# Patient Record
Sex: MALE | Race: BLACK OR AFRICAN AMERICAN | Employment: UNEMPLOYED | URBAN - METROPOLITAN AREA
[De-identification: names, ages, dates, MRNs, and addresses within clinical notes are randomized per-mention and may not be internally consistent; named-entity substitution may affect disease eponyms.]

---

## 2021-06-10 ENCOUNTER — HOSPITAL ENCOUNTER (OUTPATIENT)
Age: 36
Setting detail: OBSERVATION
Discharge: HOME | DRG: 368 | End: 2021-06-11
Attending: EMERGENCY MEDICINE | Admitting: FAMILY MEDICINE
Payer: COMMERCIAL

## 2021-06-10 ENCOUNTER — APPOINTMENT (OUTPATIENT)
Dept: CT IMAGING | Age: 36
DRG: 368 | End: 2021-06-10
Attending: EMERGENCY MEDICINE
Payer: COMMERCIAL

## 2021-06-10 DIAGNOSIS — R11.2 INTRACTABLE NAUSEA AND VOMITING: Primary | ICD-10-CM

## 2021-06-10 DIAGNOSIS — K52.9 COLITIS: ICD-10-CM

## 2021-06-10 LAB
ALBUMIN SERPL-MCNC: 3.7 G/DL (ref 3.5–5)
ALBUMIN/GLOB SERPL: 1 (ref 1.2–3.5)
ALP SERPL-CCNC: 53 U/L (ref 50–136)
ALT SERPL-CCNC: 42 U/L (ref 12–65)
AMPHET UR QL SCN: NEGATIVE
ANION GAP SERPL CALC-SCNC: 7 MMOL/L (ref 7–16)
APPEARANCE UR: CLEAR
AST SERPL-CCNC: 31 U/L (ref 15–37)
BARBITURATES UR QL SCN: NEGATIVE
BASOPHILS # BLD: 0.1 K/UL (ref 0–0.2)
BASOPHILS NFR BLD: 1 % (ref 0–2)
BENZODIAZ UR QL: NEGATIVE
BILIRUB SERPL-MCNC: 0.5 MG/DL (ref 0.2–1.1)
BILIRUB UR QL: NEGATIVE
BUN SERPL-MCNC: 9 MG/DL (ref 6–23)
CALCIUM SERPL-MCNC: 9.2 MG/DL (ref 8.3–10.4)
CANNABINOIDS UR QL SCN: POSITIVE
CHLORIDE SERPL-SCNC: 108 MMOL/L (ref 98–107)
CO2 SERPL-SCNC: 24 MMOL/L (ref 21–32)
COCAINE UR QL SCN: NEGATIVE
COLOR UR: YELLOW
CREAT SERPL-MCNC: 0.94 MG/DL (ref 0.8–1.5)
DIFFERENTIAL METHOD BLD: ABNORMAL
EOSINOPHIL # BLD: 0.1 K/UL (ref 0–0.8)
EOSINOPHIL NFR BLD: 1 % (ref 0.5–7.8)
ERYTHROCYTE [DISTWIDTH] IN BLOOD BY AUTOMATED COUNT: 13.3 % (ref 11.9–14.6)
GLOBULIN SER CALC-MCNC: 3.8 G/DL (ref 2.3–3.5)
GLUCOSE SERPL-MCNC: 116 MG/DL (ref 65–100)
GLUCOSE UR STRIP.AUTO-MCNC: NEGATIVE MG/DL
HCT VFR BLD AUTO: 44.6 % (ref 41.1–50.3)
HGB BLD-MCNC: 14.8 G/DL (ref 13.6–17.2)
HGB UR QL STRIP: NEGATIVE
IMM GRANULOCYTES # BLD AUTO: 0.1 K/UL (ref 0–0.5)
IMM GRANULOCYTES NFR BLD AUTO: 1 % (ref 0–5)
KETONES UR QL STRIP.AUTO: 15 MG/DL
LACTATE SERPL-SCNC: 1.4 MMOL/L (ref 0.4–2)
LEUKOCYTE ESTERASE UR QL STRIP.AUTO: NEGATIVE
LIPASE SERPL-CCNC: 101 U/L (ref 73–393)
LYMPHOCYTES # BLD: 3.1 K/UL (ref 0.5–4.6)
LYMPHOCYTES NFR BLD: 22 % (ref 13–44)
MCH RBC QN AUTO: 29.3 PG (ref 26.1–32.9)
MCHC RBC AUTO-ENTMCNC: 33.2 G/DL (ref 31.4–35)
MCV RBC AUTO: 88.3 FL (ref 79.6–97.8)
METHADONE UR QL: NEGATIVE
MONOCYTES # BLD: 1.2 K/UL (ref 0.1–1.3)
MONOCYTES NFR BLD: 8 % (ref 4–12)
NEUTS SEG # BLD: 9.5 K/UL (ref 1.7–8.2)
NEUTS SEG NFR BLD: 68 % (ref 43–78)
NITRITE UR QL STRIP.AUTO: NEGATIVE
NRBC # BLD: 0 K/UL (ref 0–0.2)
OPIATES UR QL: NEGATIVE
PCP UR QL: NEGATIVE
PH UR STRIP: 8 (ref 5–9)
PLATELET # BLD AUTO: 311 K/UL (ref 150–450)
PMV BLD AUTO: 10 FL (ref 9.4–12.3)
POTASSIUM SERPL-SCNC: 4.7 MMOL/L (ref 3.5–5.1)
PROT SERPL-MCNC: 7.5 G/DL (ref 6.3–8.2)
PROT UR STRIP-MCNC: NEGATIVE MG/DL
RBC # BLD AUTO: 5.05 M/UL (ref 4.23–5.6)
SODIUM SERPL-SCNC: 139 MMOL/L (ref 136–145)
SP GR UR REFRACTOMETRY: 1.03 (ref 1–1.02)
UROBILINOGEN UR QL STRIP.AUTO: 0.2 EU/DL (ref 0.2–1)
WBC # BLD AUTO: 14 K/UL (ref 4.3–11.1)

## 2021-06-10 PROCEDURE — 96374 THER/PROPH/DIAG INJ IV PUSH: CPT

## 2021-06-10 PROCEDURE — 80307 DRUG TEST PRSMV CHEM ANLYZR: CPT

## 2021-06-10 PROCEDURE — 74011000636 HC RX REV CODE- 636: Performed by: EMERGENCY MEDICINE

## 2021-06-10 PROCEDURE — 74011000250 HC RX REV CODE- 250: Performed by: EMERGENCY MEDICINE

## 2021-06-10 PROCEDURE — 74011250637 HC RX REV CODE- 250/637: Performed by: FAMILY MEDICINE

## 2021-06-10 PROCEDURE — 99218 HC RM OBSERVATION: CPT

## 2021-06-10 PROCEDURE — 81003 URINALYSIS AUTO W/O SCOPE: CPT

## 2021-06-10 PROCEDURE — 74011250636 HC RX REV CODE- 250/636: Performed by: EMERGENCY MEDICINE

## 2021-06-10 PROCEDURE — 80053 COMPREHEN METABOLIC PANEL: CPT

## 2021-06-10 PROCEDURE — 74177 CT ABD & PELVIS W/CONTRAST: CPT

## 2021-06-10 PROCEDURE — 96376 TX/PRO/DX INJ SAME DRUG ADON: CPT

## 2021-06-10 PROCEDURE — 74011000258 HC RX REV CODE- 258: Performed by: EMERGENCY MEDICINE

## 2021-06-10 PROCEDURE — 99284 EMERGENCY DEPT VISIT MOD MDM: CPT

## 2021-06-10 PROCEDURE — C9113 INJ PANTOPRAZOLE SODIUM, VIA: HCPCS | Performed by: FAMILY MEDICINE

## 2021-06-10 PROCEDURE — 87040 BLOOD CULTURE FOR BACTERIA: CPT

## 2021-06-10 PROCEDURE — 83690 ASSAY OF LIPASE: CPT

## 2021-06-10 PROCEDURE — 2709999900 HC NON-CHARGEABLE SUPPLY

## 2021-06-10 PROCEDURE — 36415 COLL VENOUS BLD VENIPUNCTURE: CPT

## 2021-06-10 PROCEDURE — 74011000250 HC RX REV CODE- 250: Performed by: FAMILY MEDICINE

## 2021-06-10 PROCEDURE — 85025 COMPLETE CBC W/AUTO DIFF WBC: CPT

## 2021-06-10 PROCEDURE — 96375 TX/PRO/DX INJ NEW DRUG ADDON: CPT

## 2021-06-10 PROCEDURE — 74011250636 HC RX REV CODE- 250/636: Performed by: FAMILY MEDICINE

## 2021-06-10 PROCEDURE — 83605 ASSAY OF LACTIC ACID: CPT

## 2021-06-10 RX ORDER — CIPROFLOXACIN 2 MG/ML
400 INJECTION, SOLUTION INTRAVENOUS EVERY 12 HOURS
Status: DISCONTINUED | OUTPATIENT
Start: 2021-06-11 | End: 2021-06-11 | Stop reason: HOSPADM

## 2021-06-10 RX ORDER — SUCRALFATE 1 G/1
1 TABLET ORAL
Status: DISCONTINUED | OUTPATIENT
Start: 2021-06-10 | End: 2021-06-11 | Stop reason: HOSPADM

## 2021-06-10 RX ORDER — SODIUM CHLORIDE 0.9 % (FLUSH) 0.9 %
5-40 SYRINGE (ML) INJECTION AS NEEDED
Status: DISCONTINUED | OUTPATIENT
Start: 2021-06-10 | End: 2021-06-11 | Stop reason: HOSPADM

## 2021-06-10 RX ORDER — CIPROFLOXACIN 2 MG/ML
400 INJECTION, SOLUTION INTRAVENOUS ONCE
Status: COMPLETED | OUTPATIENT
Start: 2021-06-10 | End: 2021-06-10

## 2021-06-10 RX ORDER — ACETAMINOPHEN 650 MG/1
650 SUPPOSITORY RECTAL
Status: DISCONTINUED | OUTPATIENT
Start: 2021-06-10 | End: 2021-06-11 | Stop reason: HOSPADM

## 2021-06-10 RX ORDER — MORPHINE SULFATE 2 MG/ML
2 INJECTION, SOLUTION INTRAMUSCULAR; INTRAVENOUS
Status: DISCONTINUED | OUTPATIENT
Start: 2021-06-10 | End: 2021-06-11 | Stop reason: HOSPADM

## 2021-06-10 RX ORDER — SODIUM CHLORIDE 9 MG/ML
125 INJECTION, SOLUTION INTRAVENOUS CONTINUOUS
Status: DISCONTINUED | OUTPATIENT
Start: 2021-06-10 | End: 2021-06-11 | Stop reason: HOSPADM

## 2021-06-10 RX ORDER — METRONIDAZOLE 500 MG/100ML
500 INJECTION, SOLUTION INTRAVENOUS EVERY 8 HOURS
Status: DISCONTINUED | OUTPATIENT
Start: 2021-06-10 | End: 2021-06-11 | Stop reason: HOSPADM

## 2021-06-10 RX ORDER — SODIUM CHLORIDE 0.9 % (FLUSH) 0.9 %
10 SYRINGE (ML) INJECTION
Status: COMPLETED | OUTPATIENT
Start: 2021-06-10 | End: 2021-06-10

## 2021-06-10 RX ORDER — ACETAMINOPHEN 325 MG/1
650 TABLET ORAL
Status: DISCONTINUED | OUTPATIENT
Start: 2021-06-10 | End: 2021-06-11 | Stop reason: HOSPADM

## 2021-06-10 RX ORDER — ONDANSETRON 2 MG/ML
4 INJECTION INTRAMUSCULAR; INTRAVENOUS
Status: COMPLETED | OUTPATIENT
Start: 2021-06-10 | End: 2021-06-10

## 2021-06-10 RX ORDER — POLYETHYLENE GLYCOL 3350 17 G/17G
17 POWDER, FOR SOLUTION ORAL DAILY PRN
Status: DISCONTINUED | OUTPATIENT
Start: 2021-06-10 | End: 2021-06-11 | Stop reason: HOSPADM

## 2021-06-10 RX ORDER — METRONIDAZOLE 500 MG/100ML
500 INJECTION, SOLUTION INTRAVENOUS
Status: COMPLETED | OUTPATIENT
Start: 2021-06-10 | End: 2021-06-10

## 2021-06-10 RX ORDER — SODIUM CHLORIDE 0.9 % (FLUSH) 0.9 %
5-10 SYRINGE (ML) INJECTION EVERY 8 HOURS
Status: DISCONTINUED | OUTPATIENT
Start: 2021-06-10 | End: 2021-06-11 | Stop reason: HOSPADM

## 2021-06-10 RX ORDER — ONDANSETRON 2 MG/ML
4 INJECTION INTRAMUSCULAR; INTRAVENOUS
Status: DISCONTINUED | OUTPATIENT
Start: 2021-06-10 | End: 2021-06-11 | Stop reason: HOSPADM

## 2021-06-10 RX ORDER — ENOXAPARIN SODIUM 100 MG/ML
40 INJECTION SUBCUTANEOUS DAILY
Status: DISCONTINUED | OUTPATIENT
Start: 2021-06-11 | End: 2021-06-11 | Stop reason: HOSPADM

## 2021-06-10 RX ORDER — KETOROLAC TROMETHAMINE 30 MG/ML
30 INJECTION, SOLUTION INTRAMUSCULAR; INTRAVENOUS
Status: COMPLETED | OUTPATIENT
Start: 2021-06-10 | End: 2021-06-10

## 2021-06-10 RX ORDER — SODIUM CHLORIDE 0.9 % (FLUSH) 0.9 %
5-10 SYRINGE (ML) INJECTION AS NEEDED
Status: DISCONTINUED | OUTPATIENT
Start: 2021-06-10 | End: 2021-06-11 | Stop reason: HOSPADM

## 2021-06-10 RX ORDER — HALOPERIDOL 5 MG/ML
5 INJECTION INTRAMUSCULAR ONCE
Status: DISCONTINUED | OUTPATIENT
Start: 2021-06-10 | End: 2021-06-10

## 2021-06-10 RX ORDER — ONDANSETRON 4 MG/1
4 TABLET, ORALLY DISINTEGRATING ORAL
Status: DISCONTINUED | OUTPATIENT
Start: 2021-06-10 | End: 2021-06-11 | Stop reason: HOSPADM

## 2021-06-10 RX ORDER — SODIUM CHLORIDE 0.9 % (FLUSH) 0.9 %
5-40 SYRINGE (ML) INJECTION EVERY 8 HOURS
Status: DISCONTINUED | OUTPATIENT
Start: 2021-06-10 | End: 2021-06-11 | Stop reason: HOSPADM

## 2021-06-10 RX ORDER — HALOPERIDOL 5 MG/ML
5 INJECTION INTRAMUSCULAR ONCE
Status: COMPLETED | OUTPATIENT
Start: 2021-06-10 | End: 2021-06-10

## 2021-06-10 RX ADMIN — PROMETHAZINE HYDROCHLORIDE 12.5 MG: 25 INJECTION INTRAMUSCULAR; INTRAVENOUS at 11:18

## 2021-06-10 RX ADMIN — ONDANSETRON 4 MG: 2 INJECTION INTRAMUSCULAR; INTRAVENOUS at 20:28

## 2021-06-10 RX ADMIN — Medication 10 ML: at 12:21

## 2021-06-10 RX ADMIN — SODIUM CHLORIDE 1000 ML: 900 INJECTION, SOLUTION INTRAVENOUS at 11:01

## 2021-06-10 RX ADMIN — SODIUM CHLORIDE 40 MG: 9 INJECTION, SOLUTION INTRAMUSCULAR; INTRAVENOUS; SUBCUTANEOUS at 20:17

## 2021-06-10 RX ADMIN — Medication 10 ML: at 22:51

## 2021-06-10 RX ADMIN — SUCRALFATE 1 G: 1 TABLET ORAL at 22:39

## 2021-06-10 RX ADMIN — SUCRALFATE 1 G: 1 TABLET ORAL at 15:46

## 2021-06-10 RX ADMIN — SODIUM CHLORIDE 125 ML/HR: 900 INJECTION, SOLUTION INTRAVENOUS at 15:39

## 2021-06-10 RX ADMIN — MORPHINE SULFATE 2 MG: 2 INJECTION, SOLUTION INTRAMUSCULAR; INTRAVENOUS at 20:16

## 2021-06-10 RX ADMIN — SODIUM CHLORIDE 125 ML/HR: 900 INJECTION, SOLUTION INTRAVENOUS at 22:00

## 2021-06-10 RX ADMIN — ONDANSETRON 4 MG: 2 INJECTION INTRAMUSCULAR; INTRAVENOUS at 11:02

## 2021-06-10 RX ADMIN — ACETAMINOPHEN 650 MG: 325 TABLET, FILM COATED ORAL at 15:46

## 2021-06-10 RX ADMIN — Medication 10 ML: at 15:40

## 2021-06-10 RX ADMIN — KETOROLAC TROMETHAMINE 30 MG: 30 INJECTION, SOLUTION INTRAMUSCULAR; INTRAVENOUS at 13:27

## 2021-06-10 RX ADMIN — SODIUM CHLORIDE 100 ML: 900 INJECTION, SOLUTION INTRAVENOUS at 12:21

## 2021-06-10 RX ADMIN — CIPROFLOXACIN 400 MG: 2 INJECTION, SOLUTION INTRAVENOUS at 14:26

## 2021-06-10 RX ADMIN — Medication 10 ML: at 22:52

## 2021-06-10 RX ADMIN — SODIUM CHLORIDE 40 MG: 9 INJECTION, SOLUTION INTRAMUSCULAR; INTRAVENOUS; SUBCUTANEOUS at 14:25

## 2021-06-10 RX ADMIN — IOPAMIDOL 100 ML: 755 INJECTION, SOLUTION INTRAVENOUS at 12:21

## 2021-06-10 RX ADMIN — MORPHINE SULFATE 2 MG: 2 INJECTION, SOLUTION INTRAMUSCULAR; INTRAVENOUS at 14:21

## 2021-06-10 RX ADMIN — HALOPERIDOL LACTATE 5 MG: 5 INJECTION, SOLUTION INTRAMUSCULAR at 13:54

## 2021-06-10 RX ADMIN — METRONIDAZOLE 500 MG: 500 INJECTION, SOLUTION INTRAVENOUS at 14:32

## 2021-06-10 RX ADMIN — METRONIDAZOLE 500 MG: 500 INJECTION, SOLUTION INTRAVENOUS at 22:39

## 2021-06-10 NOTE — ACP (ADVANCE CARE PLANNING)
Advance care planninginitial encounter      Face to face time -15 minutes face-to-face time spent discussion the care       Patient is able to make his/her own decisions:  [X] Yes  [ ] NO    Names of POA/surrogate decision maker when patient is altered  : Wife    Other members present in the meeting : Wife  Patient / surrogate decision-maker ultimately chose. .. [X] Full code- full aggressive medical and surgical interventions, including intubations, resuscitations, pressors, artificial tube feeding  [ ] DO NOT RESUSCITATE -okay to intubate,  and other aggressive medical and surgical intervention   [ ] Not resuscitate, DO NOT INTUBATE, but okay for other aggressive medical and surgical intervention   [ ] DNR/DNI, hospice, comfort focus care to maximize patient's quality of life  [ ] DNR/DNI, strict comfort care ONLY        Further discussion regarding principle disease process. prognosis, plans of care, and treatment goals  : Discussed in detail goal of care and patient wants to be full code.

## 2021-06-10 NOTE — ED PROVIDER NOTES
79-year-old male presents with complaint of nausea, vomiting, diarrhea, generalized abdominal discomfort that is progressed since Saturday. States that he has been unable to keep anything down over the past 24 hours. Patient states that he ate what he believes to be \"raw fried chicken\" from a local [employer]. States symptoms developed after this was ingested. Patient denies fever, chills, chest pain, shortness breath, cough, melena, hematochezia, dizziness, weakness, headache, recent sick contacts, urinary symptoms, flank pain. Patient states that he did have 2 shots of liquor on Sunday but has not drank since then. Patient denies regular excessive alcohol ingestion. Patient does state that he smokes marijuana regularly having smoked earlier today. The history is provided by the patient. No  was used. Vomiting   This is a new problem. The current episode started more than 2 days ago. The problem occurs 5 to 10 times per day. The problem has not changed since onset. The emesis has an appearance of stomach contents. There has been no fever. Associated symptoms include abdominal pain and diarrhea. Pertinent negatives include no chills, no fever, no sweats, no headaches, no arthralgias, no myalgias, no cough, no URI and no headaches. History reviewed. No pertinent past medical history. History reviewed. No pertinent surgical history. History reviewed. No pertinent family history.     Social History     Socioeconomic History    Marital status: SINGLE     Spouse name: Not on file    Number of children: Not on file    Years of education: Not on file    Highest education level: Not on file   Occupational History    Not on file   Tobacco Use    Smoking status: Never Smoker   Substance and Sexual Activity    Alcohol use: No    Drug use: No    Sexual activity: Not on file   Other Topics Concern    Not on file   Social History Narrative    Not on file     Social Determinants of Health     Financial Resource Strain:     Difficulty of Paying Living Expenses:    Food Insecurity:     Worried About Running Out of Food in the Last Year:     [address] in the Last Year:    Transportation Needs:     Lack of Transportation (Medical):  Lack of Transportation (Non-Medical):    Physical Activity:     Days of Exercise per Week:     Minutes of Exercise per Session:    Stress:     Feeling of Stress :    Social Connections:     Frequency of Communication with Friends and Family:     Frequency of Social Gatherings with Friends and Family:     Attends [employer] Services:     Active Member of Clubs or Organizations:     Attends Club or Organization Meetings:     Marital Status:    Intimate Partner Violence:     Fear of Current or Ex-Partner:     Emotionally Abused:     Physically Abused:     Sexually Abused: ALLERGIES: Patient has no known allergies. Review of Systems   Constitutional: Negative for chills, fatigue and fever. HENT: Negative for congestion and rhinorrhea. Respiratory: Negative for cough and shortness of breath. Cardiovascular: Negative for chest pain. Gastrointestinal: Positive for abdominal pain, diarrhea, nausea and vomiting. Negative for anal bleeding, blood in stool and constipation. Genitourinary: Negative for decreased urine volume, dysuria, flank pain and hematuria. Musculoskeletal: Negative for arthralgias and myalgias. Skin: Negative for color change and rash. Neurological: Negative for dizziness, syncope, weakness, light-headedness and headaches. Hematological: Does not bruise/bleed easily. Psychiatric/Behavioral: Negative for confusion. Vitals:    06/10/21 1043   BP: (!) 158/107   Pulse: 75   Resp: 16   Temp: 97.7 °F (36.5 °C)   SpO2: 100%   Weight: 122.5 kg (270 lb)   Height: 6' 1\" (1.854 m)            Physical Exam  Vitals and nursing note reviewed.    Constitutional:       Appearance: Normal appearance. Comments: Ill-appearing. HENT:      Head: Normocephalic. Nose: Nose normal.      Mouth/Throat:      Mouth: Mucous membranes are moist.   Eyes:      Extraocular Movements: Extraocular movements intact. Pupils: Pupils are equal, round, and reactive to light. Cardiovascular:      Rate and Rhythm: Normal rate. Pulses: Normal pulses. Heart sounds: Normal heart sounds. Pulmonary:      Effort: Pulmonary effort is normal.      Breath sounds: Normal breath sounds. Comments: CTAB. Abdominal:      General: Bowel sounds are normal.      Palpations: Abdomen is soft. Tenderness: There is no abdominal tenderness. There is no guarding or rebound. Comments: Soft. Obese. Diffuse tenderness palpation. No rebound or guarding. No CVA tenderness. Musculoskeletal:         General: Normal range of motion. Cervical back: Normal range of motion. No rigidity. Skin:     Findings: No erythema or rash. Neurological:      General: No focal deficit present. Mental Status: He is alert and oriented to person, place, and time. Motor: No weakness. MDM  Number of Diagnoses or Management Options  Colitis: new and requires workup  Intractable nausea and vomiting: new and requires workup  Diagnosis management comments: VSS. Afebrile. With numerous episodes of nonbilious nonbloody emesis in the department. Patient given Zofran, Phenergan without improvement of symptoms. Patient with elevated white blood cell count 14. CT abdomen pelvis with thickening of descending colon. Patient continues to vomit profusely in room. Patient given Toradol 30 mg IV. Will cover w/ Cipro + Flagyl. Blood cultures obtained prior to antibiotics given. Hospitalist consulted for admission. Will order Haldol as well.          Amount and/or Complexity of Data Reviewed  Clinical lab tests: ordered and reviewed  Tests in the radiology section of CPT®: reviewed and ordered  Tests in the medicine section of CPT®: ordered and reviewed  Review and summarize past medical records: yes  Discuss the patient with other providers: yes  Independent visualization of images, tracings, or specimens: yes    Risk of Complications, Morbidity, and/or Mortality  Presenting problems: moderate  Diagnostic procedures: moderate  Management options: moderate    Patient Progress  Patient progress: stable    ED Course as of Sal 10 1300   Thu Sal 10, 2021   1241 CT abd/pelvis    CT PELVIS:  No mass, adenopathy or abnormal fluid collection.     No bowel obstruction. There does appear to be wall thickening in the ascending  colon with no infiltration in the surrounding fat. This is nonspecific.           IMPRESSION  1. Mild wall thickening in the ascending colon. [DF]      ED Course User Index  [DF] David Rodriguez MD       EKG    Date/Time: 6/10/2021 1:04 PM  Performed by: David Rodriguez MD  Authorized by:  David Rodriguez MD     ECG reviewed by ED Physician in the absence of a cardiologist: yes    Rate:     ECG rate:  65    ECG rate assessment: normal    Rhythm:     Rhythm: sinus rhythm    Ectopy:     Ectopy: none    QRS:     QRS axis:  Normal    QRS intervals:  Normal  Conduction:     Conduction: normal    ST segments:     ST segments:  Normal  T waves:     T waves: normal                Results Include:    Recent Results (from the past 24 hour(s))   CBC WITH AUTOMATED DIFF    Collection Time: 06/10/21 11:01 AM   Result Value Ref Range    WBC 14.0 (H) 4.3 - 11.1 K/uL    RBC 5.05 4.23 - 5.6 M/uL    HGB 14.8 13.6 - 17.2 g/dL    HCT 44.6 41.1 - 50.3 %    MCV 88.3 79.6 - 97.8 FL    MCH 29.3 26.1 - 32.9 PG    MCHC 33.2 31.4 - 35.0 g/dL    RDW 13.3 11.9 - 14.6 %    PLATELET 657 198 - 312 K/uL    MPV 10.0 9.4 - 12.3 FL    ABSOLUTE NRBC 0.00 0.0 - 0.2 K/uL    DF AUTOMATED      NEUTROPHILS 68 43 - 78 %    LYMPHOCYTES 22 13 - 44 %    MONOCYTES 8 4.0 - 12.0 %    EOSINOPHILS 1 0.5 - 7.8 % BASOPHILS 1 0.0 - 2.0 %    IMMATURE GRANULOCYTES 1 0.0 - 5.0 %    ABS. NEUTROPHILS 9.5 (H) 1.7 - 8.2 K/UL    ABS. LYMPHOCYTES 3.1 0.5 - 4.6 K/UL    ABS. MONOCYTES 1.2 0.1 - 1.3 K/UL    ABS. EOSINOPHILS 0.1 0.0 - 0.8 K/UL    ABS. BASOPHILS 0.1 0.0 - 0.2 K/UL    ABS. IMM. GRANS. 0.1 0.0 - 0.5 K/UL   METABOLIC PANEL, COMPREHENSIVE    Collection Time: 06/10/21 11:01 AM   Result Value Ref Range    Sodium 139 136 - 145 mmol/L    Potassium 4.7 3.5 - 5.1 mmol/L    Chloride 108 (H) 98 - 107 mmol/L    CO2 24 21 - 32 mmol/L    Anion gap 7 7 - 16 mmol/L    Glucose 116 (H) 65 - 100 mg/dL    BUN 9 6 - 23 MG/DL    Creatinine 0.94 0.8 - 1.5 MG/DL    GFR est AA >60 >60 ml/min/1.73m2    GFR est non-AA >60 >60 ml/min/1.73m2    Calcium 9.2 8.3 - 10.4 MG/DL    Bilirubin, total 0.5 0.2 - 1.1 MG/DL    ALT (SGPT) 42 12 - 65 U/L    AST (SGOT) 31 15 - 37 U/L    Alk. phosphatase 53 50 - 136 U/L    Protein, total 7.5 6.3 - 8.2 g/dL    Albumin 3.7 3.5 - 5.0 g/dL    Globulin 3.8 (H) 2.3 - 3.5 g/dL    A-G Ratio 1.0 (L) 1.2 - 3.5     LIPASE    Collection Time: 06/10/21 11:01 AM   Result Value Ref Range    Lipase 101 73 - 393 U/L     Jorge Ng MD; 6/10/2021 @1:03 PM Voice dictation software was used during the making of this note. This software is not perfect and grammatical and other typographical errors may be present.   This note has not been proofread for errors.  ===================================================================

## 2021-06-10 NOTE — H&P
[facility] Hospitalist Note     Admit Date:  6/10/2021 10:45 AM   Name:  Gautam Hayes   Age:  38 y.o.  :  1985   MRN:  [id number]   PCP:  None  Treatment Team: Attending Provider: Ed Loyola MD; Primary Nurse: Aung Holguin, RN; : Cynthia Johnson    HPI/Subjective:   26-year-old male with past medical history significant for GERD presented to the ED with persistent nausea/vomiting/diarrhea and generalized abdominal pain. Patient reports symptoms started on Monday after eating chicken from a local [employer]. He states he is not able to keep anything down for 1 day. Reports several episodes of diarrhea and vomiting/day. Patient denies fever, chills, shortness of breath, chest pain, melena, hematochezia, dizziness, or dysuria. Denies any sick contact. He reports he drinks socially, last drink on . He smokes marijuana, last smoked today. In the ED patient was vitally stable, afebrile. Patient was given Zofran/Phenergan without improvement of persistent nausea/vomiting. Labs remarkable for leukocytosis. UDS positive for THC. CT abdomen findings suggestive of mild colitis. Hospitalist consulted for further management.     Assessment and Plan:     Gastroenteritis/colitis:  Symptoms started after eating chicken at a local [employer]  Patient with numerous episodes of vomiting in the ED, leukocytosis  CT abdomen with mild wall thickness of ascending colon  Blood culture obtained in the ED  UA negative for UTI  UDS positive for THC  Stool culture ordered  Check lactic acid  Patient was given 1 dose of Cipro/Flagyl in the ED, continue empirically for now  Protonix 40 mg IV twice daily  Sucralfate 4 times daily  Continue maintenance IV fluid  Pain control  Antiemetic  Clear liquid diet, hold if not able to tolerate    Elevated blood pressure:  Likely due to pain  Pain control  Monitor for now, if remains elevated will add antihypertensive    Marijuana use:      Discharge planning: Admit to medical floor    DVT ppx ordered  Code status:  Full  Estimated LOS: Less than 2 midnights  Risk:  high    Hospital Problems as of 6/10/2021 Never Reviewed        Codes Class Noted - Resolved POA    Gastroenteritis ICD-10-CM: K52.9  ICD-9-CM: 558.9  6/10/2021 - Present Unknown        Intractable nausea and vomiting ICD-10-CM: R11.2  ICD-9-CM: 536.2  6/10/2021 - Present Unknown                10 systems reviewed and negative except as noted in HPI. Past Medical History:   Diagnosis Date    Gastroenteritis 6/10/2021      History reviewed. No pertinent surgical history. No Known Allergies   Social History     Tobacco Use    Smoking status: Never Smoker   Substance Use Topics    Alcohol use: No      History reviewed. No pertinent family history. Family history reviewed and noncontributory. There is no immunization history on file for this patient. PTA Medications:  None       Objective:     Patient Vitals for the past 24 hrs:   Temp Pulse Resp BP SpO2   06/10/21 1043 97.7 °F (36.5 °C) 75 16 (!) 158/107 100 %     Oxygen Therapy  O2 Sat (%): 100 % (06/10/21 1043)  O2 Device: None (Room air) (06/10/21 1043)    Estimated body mass index is 35.62 kg/m² as calculated from the following:    Height as of this encounter: 6' 1\" (1.854 m). Weight as of this encounter: 122.5 kg (270 lb). No intake or output data in the 24 hours ending 06/10/21 1409    *Note that automatically entered I/Os may not be accurate; dependent on patient compliance with collection and accurate  by assistants. Physical Exam:  General:    Well nourished. Alert. Eyes:   Normal sclerae. Extraocular movements intact. HENT:  Normocephalic, atraumatic. Moist mucous membranes  CV:   RRR. No m/r/g. Lungs:  CTAB. No wheezing, rhonchi, or rales. Abdomen: Soft, tender to palpate mid abdomen, nondistended  Extremities: Warm and dry. No cyanosis or edema.   Neurologic: CN II-XII grossly intact. Sensation intact. Skin:     No rashes or jaundice. Normal coloration  Psych:  Normal mood and affect. I reviewed the labs, imaging, EKGs, telemetry, and other studies done this admission. Data Reviewed:   Recent Results (from the past 24 hour(s))   CBC WITH AUTOMATED DIFF    Collection Time: 06/10/21 11:01 AM   Result Value Ref Range    WBC 14.0 (H) 4.3 - 11.1 K/uL    RBC 5.05 4.23 - 5.6 M/uL    HGB 14.8 13.6 - 17.2 g/dL    HCT 44.6 41.1 - 50.3 %    MCV 88.3 79.6 - 97.8 FL    MCH 29.3 26.1 - 32.9 PG    MCHC 33.2 31.4 - 35.0 g/dL    RDW 13.3 11.9 - 14.6 %    PLATELET 235 773 - 034 K/uL    MPV 10.0 9.4 - 12.3 FL    ABSOLUTE NRBC 0.00 0.0 - 0.2 K/uL    DF AUTOMATED      NEUTROPHILS 68 43 - 78 %    LYMPHOCYTES 22 13 - 44 %    MONOCYTES 8 4.0 - 12.0 %    EOSINOPHILS 1 0.5 - 7.8 %    BASOPHILS 1 0.0 - 2.0 %    IMMATURE GRANULOCYTES 1 0.0 - 5.0 %    ABS. NEUTROPHILS 9.5 (H) 1.7 - 8.2 K/UL    ABS. LYMPHOCYTES 3.1 0.5 - 4.6 K/UL    ABS. MONOCYTES 1.2 0.1 - 1.3 K/UL    ABS. EOSINOPHILS 0.1 0.0 - 0.8 K/UL    ABS. BASOPHILS 0.1 0.0 - 0.2 K/UL    ABS. IMM. GRANS. 0.1 0.0 - 0.5 K/UL   METABOLIC PANEL, COMPREHENSIVE    Collection Time: 06/10/21 11:01 AM   Result Value Ref Range    Sodium 139 136 - 145 mmol/L    Potassium 4.7 3.5 - 5.1 mmol/L    Chloride 108 (H) 98 - 107 mmol/L    CO2 24 21 - 32 mmol/L    Anion gap 7 7 - 16 mmol/L    Glucose 116 (H) 65 - 100 mg/dL    BUN 9 6 - 23 MG/DL    Creatinine 0.94 0.8 - 1.5 MG/DL    GFR est AA >60 >60 ml/min/1.73m2    GFR est non-AA >60 >60 ml/min/1.73m2    Calcium 9.2 8.3 - 10.4 MG/DL    Bilirubin, total 0.5 0.2 - 1.1 MG/DL    ALT (SGPT) 42 12 - 65 U/L    AST (SGOT) 31 15 - 37 U/L    Alk.  phosphatase 53 50 - 136 U/L    Protein, total 7.5 6.3 - 8.2 g/dL    Albumin 3.7 3.5 - 5.0 g/dL    Globulin 3.8 (H) 2.3 - 3.5 g/dL    A-G Ratio 1.0 (L) 1.2 - 3.5     LIPASE    Collection Time: 06/10/21 11:01 AM   Result Value Ref Range    Lipase 101 73 - 393 U/L   URINALYSIS W/ RFLX MICROSCOPIC    Collection Time: 06/10/21  1:28 PM   Result Value Ref Range    Color YELLOW      Appearance CLEAR      Specific gravity 1.028 (H) 1.001 - 1.023      pH (UA) 8.0 5.0 - 9.0      Protein Negative NEG mg/dL    Glucose Negative mg/dL    Ketone 15 (A) NEG mg/dL    Bilirubin Negative NEG      Blood Negative NEG      Urobilinogen 0.2 0.2 - 1.0 EU/dL    Nitrites Negative NEG      Leukocyte Esterase Negative NEG         All Micro Results     Procedure Component Value Units Date/Time    CULTURE, BLOOD [[id number]]     Order Status: Sent Specimen: Blood     CULTURE, BLOOD [[id number]]     Order Status: Sent Specimen: Blood           Current Facility-Administered Medications   Medication Dose Route Frequency    sodium chloride (NS) flush 5-10 mL  5-10 mL IntraVENous Q8H    sodium chloride (NS) flush 5-10 mL  5-10 mL IntraVENous PRN    metroNIDAZOLE (FLAGYL) IVPB premix 500 mg  500 mg IntraVENous NOW    ciprofloxacin (CIPRO) 400 mg in D5W IVPB (premix)  400 mg IntraVENous ONCE    pantoprazole (PROTONIX) 40 mg in 0.9% sodium chloride 10 mL injection  40 mg IntraVENous Q12H    sucralfate (CARAFATE) tablet 1 g  1 g Oral AC&HS    ciprofloxacin (CIPRO) 400 mg in D5W IVPB (premix)  400 mg IntraVENous Q12H    metroNIDAZOLE (FLAGYL) IVPB premix 500 mg  500 mg IntraVENous Q8H    0.9% sodium chloride infusion  125 mL/hr IntraVENous CONTINUOUS    morphine injection 2 mg  2 mg IntraVENous Q6H PRN     No current outpatient medications on file. Other Studies:  No results found for this visit on 06/10/21.     CT ABD PELV W CONT    Result Date: 6/10/2021  CT abdomen and pelvis DATE: 6/10/2021 CLINICAL INFORMATION: Abdominal pain Spiral images of the abdomen and pelvis were obtained using 1 cc Isovue 370 intravenous contrast. All CT scanners at this facility use one or all of the following:  automated exposure control, adjustment of the mA and or kVp according to patient size, iterative reconstruction CT ABDOMEN: Upper images show calcified lymph node inferior left hilum. Spleen is normal. Liver is normal. No calcified gallstones. Normal pancreas. Normal adrenals. Normal kidneys. No hydronephrosis. Abdominal aorta is normal in size. CT PELVIS: No mass, adenopathy or abnormal fluid collection. No bowel obstruction. There does appear to be wall thickening in the ascending colon with no infiltration in the surrounding fat. This is nonspecific. 1. Mild wall thickening in the ascending colon. SARS-CoV-2 Lab Results  \"Novel Coronavirus\" Test: No results found for: COV2NT   \"Emergent Disease\" Test: No results found for: EDPR  \"SARS-COV-2\" Test: No results found for: XGCOVT  Rapid Test: No results found for: COVR            Part of this note was written by using a voice dictation software and the note has been proof read but may still contain some grammatical/other typographical errors.     Signed:  Gustavo Manning MD

## 2021-06-10 NOTE — ED PROVIDER NOTES
42-year-old male presents with complaint of nausea, vomiting, diarrhea, generalized abdominal discomfort that is progressed since Saturday. States that he has been unable to keep anything down over the past 24 hours. Patient states that he ate what he believes to be \"raw fried chicken\" from a local [employer]. States symptoms developed after this was ingested. Patient denies fever, chills, chest pain, shortness breath, cough, melena, hematochezia, dizziness, weakness, headache, recent sick contacts, urinary symptoms, flank pain. Patient states that he did have 2 shots of liquor on Sunday but has not drank since then. Patient denies regular excessive alcohol ingestion. Patient does state that he smokes marijuana regularly having smoked earlier today. The history is provided by the patient. No  was used. Vomiting   This is a new problem. The current episode started more than 2 days ago. The problem occurs 5 to 10 times per day. The problem has not changed since onset. The emesis has an appearance of stomach contents. There has been no fever. Associated symptoms include abdominal pain and diarrhea. Pertinent negatives include no chills, no fever, no sweats, no headaches, no arthralgias, no myalgias, no cough, no URI and no headaches. History reviewed. No pertinent past medical history. History reviewed. No pertinent surgical history. History reviewed. No pertinent family history.     Social History     Socioeconomic History    Marital status: SINGLE     Spouse name: Not on file    Number of children: Not on file    Years of education: Not on file    Highest education level: Not on file   Occupational History    Not on file   Tobacco Use    Smoking status: Never Smoker   Substance and Sexual Activity    Alcohol use: No    Drug use: No    Sexual activity: Not on file   Other Topics Concern    Not on file   Social History Narrative    Not on file     Social Determinants of Health     Financial Resource Strain:     Difficulty of Paying Living Expenses:    Food Insecurity:     Worried About Running Out of Food in the Last Year:     [address] in the Last Year:    Transportation Needs:     Lack of Transportation (Medical):  Lack of Transportation (Non-Medical):    Physical Activity:     Days of Exercise per Week:     Minutes of Exercise per Session:    Stress:     Feeling of Stress :    Social Connections:     Frequency of Communication with Friends and Family:     Frequency of Social Gatherings with Friends and Family:     Attends Islam Services:     Active Member of Clubs or Organizations:     Attends Club or Organization Meetings:     Marital Status:    Intimate Partner Violence:     Fear of Current or Ex-Partner:     Emotionally Abused:     Physically Abused:     Sexually Abused: ALLERGIES: Patient has no known allergies. Review of Systems   Constitutional: Negative for chills, fatigue and fever. HENT: Negative for congestion and rhinorrhea. Respiratory: Negative for cough and shortness of breath. Cardiovascular: Negative for chest pain. Gastrointestinal: Positive for abdominal pain, diarrhea, nausea and vomiting. Negative for anal bleeding, blood in stool and constipation. Genitourinary: Negative for decreased urine volume, dysuria, flank pain and hematuria. Musculoskeletal: Negative for arthralgias and myalgias. Skin: Negative for color change and rash. Neurological: Negative for dizziness, syncope, weakness, light-headedness and headaches. Hematological: Does not bruise/bleed easily. Psychiatric/Behavioral: Negative for confusion. Vitals:    06/10/21 1043   BP: (!) 158/107   Pulse: 75   Resp: 16   Temp: 97.7 °F (36.5 °C)   SpO2: 100%   Weight: 122.5 kg (270 lb)   Height: 6' 1\" (1.854 m)            Physical Exam  Vitals and nursing note reviewed.    Constitutional:       Appearance: Normal appearance. Comments: Ill-appearing. HENT:      Head: Normocephalic. Nose: Nose normal.      Mouth/Throat:      Mouth: Mucous membranes are moist.   Eyes:      Extraocular Movements: Extraocular movements intact. Pupils: Pupils are equal, round, and reactive to light. Cardiovascular:      Rate and Rhythm: Normal rate. Pulses: Normal pulses. Heart sounds: Normal heart sounds. Pulmonary:      Effort: Pulmonary effort is normal.      Breath sounds: Normal breath sounds. Comments: CTAB. Abdominal:      General: Bowel sounds are normal.      Palpations: Abdomen is soft. Tenderness: There is no abdominal tenderness. There is no guarding or rebound. Comments: Soft. Obese. Diffuse tenderness palpation. No rebound or guarding. No CVA tenderness. Musculoskeletal:         General: Normal range of motion. Cervical back: Normal range of motion. No rigidity. Skin:     Findings: No erythema or rash. Neurological:      General: No focal deficit present. Mental Status: He is alert and oriented to person, place, and time. Motor: No weakness. MDM  Number of Diagnoses or Management Options  Colitis: new and requires workup  Intractable nausea and vomiting: new and requires workup  Diagnosis management comments: VSS. Afebrile. With numerous episodes of nonbilious nonbloody emesis in the department. Patient given Zofran, Phenergan without improvement of symptoms. Patient with elevated white blood cell count 14. CT abdomen pelvis with thickening of descending colon. Patient continues to vomit profusely in room. Patient given Toradol 30 mg IV. Will cover w/ Cipro + Flagyl. Blood cultures obtained prior to antibiotics given. Hospitalist consulted for admission. Will order Haldol as well.          Amount and/or Complexity of Data Reviewed  Clinical lab tests: ordered and reviewed  Tests in the radiology section of CPT®: reviewed and ordered  Tests in the medicine section of CPT®: ordered and reviewed  Review and summarize past medical records: yes  Discuss the patient with other providers: yes  Independent visualization of images, tracings, or specimens: yes    Risk of Complications, Morbidity, and/or Mortality  Presenting problems: moderate  Diagnostic procedures: moderate  Management options: moderate    Patient Progress  Patient progress: stable    ED Course as of Sal 10 1300   Thu Sal 10, 2021   1241 CT abd/pelvis    CT PELVIS:  No mass, adenopathy or abnormal fluid collection.     No bowel obstruction. There does appear to be wall thickening in the ascending  colon with no infiltration in the surrounding fat. This is nonspecific.           IMPRESSION  1. Mild wall thickening in the ascending colon. [DF]      ED Course User Index  [DF] Delfino Nuno MD       EKG    Date/Time: 6/10/2021 1:04 PM  Performed by: Delfino Nuno MD  Authorized by:  Delfino Nuno MD     ECG reviewed by ED Physician in the absence of a cardiologist: yes    Rate:     ECG rate:  65    ECG rate assessment: normal    Rhythm:     Rhythm: sinus rhythm    Ectopy:     Ectopy: none    QRS:     QRS axis:  Normal    QRS intervals:  Normal  Conduction:     Conduction: normal    ST segments:     ST segments:  Normal  T waves:     T waves: normal                Results Include:    Recent Results (from the past 24 hour(s))   CBC WITH AUTOMATED DIFF    Collection Time: 06/10/21 11:01 AM   Result Value Ref Range    WBC 14.0 (H) 4.3 - 11.1 K/uL    RBC 5.05 4.23 - 5.6 M/uL    HGB 14.8 13.6 - 17.2 g/dL    HCT 44.6 41.1 - 50.3 %    MCV 88.3 79.6 - 97.8 FL    MCH 29.3 26.1 - 32.9 PG    MCHC 33.2 31.4 - 35.0 g/dL    RDW 13.3 11.9 - 14.6 %    PLATELET 095 250 - 066 K/uL    MPV 10.0 9.4 - 12.3 FL    ABSOLUTE NRBC 0.00 0.0 - 0.2 K/uL    DF AUTOMATED      NEUTROPHILS 68 43 - 78 %    LYMPHOCYTES 22 13 - 44 %    MONOCYTES 8 4.0 - 12.0 %    EOSINOPHILS 1 0.5 - 7.8 % BASOPHILS 1 0.0 - 2.0 %    IMMATURE GRANULOCYTES 1 0.0 - 5.0 %    ABS. NEUTROPHILS 9.5 (H) 1.7 - 8.2 K/UL    ABS. LYMPHOCYTES 3.1 0.5 - 4.6 K/UL    ABS. MONOCYTES 1.2 0.1 - 1.3 K/UL    ABS. EOSINOPHILS 0.1 0.0 - 0.8 K/UL    ABS. BASOPHILS 0.1 0.0 - 0.2 K/UL    ABS. IMM. GRANS. 0.1 0.0 - 0.5 K/UL   METABOLIC PANEL, COMPREHENSIVE    Collection Time: 06/10/21 11:01 AM   Result Value Ref Range    Sodium 139 136 - 145 mmol/L    Potassium 4.7 3.5 - 5.1 mmol/L    Chloride 108 (H) 98 - 107 mmol/L    CO2 24 21 - 32 mmol/L    Anion gap 7 7 - 16 mmol/L    Glucose 116 (H) 65 - 100 mg/dL    BUN 9 6 - 23 MG/DL    Creatinine 0.94 0.8 - 1.5 MG/DL    GFR est AA >60 >60 ml/min/1.73m2    GFR est non-AA >60 >60 ml/min/1.73m2    Calcium 9.2 8.3 - 10.4 MG/DL    Bilirubin, total 0.5 0.2 - 1.1 MG/DL    ALT (SGPT) 42 12 - 65 U/L    AST (SGOT) 31 15 - 37 U/L    Alk. phosphatase 53 50 - 136 U/L    Protein, total 7.5 6.3 - 8.2 g/dL    Albumin 3.7 3.5 - 5.0 g/dL    Globulin 3.8 (H) 2.3 - 3.5 g/dL    A-G Ratio 1.0 (L) 1.2 - 3.5     LIPASE    Collection Time: 06/10/21 11:01 AM   Result Value Ref Range    Lipase 101 73 - 393 U/L     Jorge Man MD; 6/10/2021 @1:03 PM Voice dictation software was used during the making of this note. This software is not perfect and grammatical and other typographical errors may be present.   This note has not been proofread for errors.  ===================================================================

## 2021-06-10 NOTE — PROGRESS NOTES
SW met with patient to screen for discharge needs. Patient advises they are currently uninsured and are not established with primary care. SW provided verbal education on self-pay resources such as [employer], [employer], and [employer]. Patient urged to follow up with resources ASAP. All questions answered. Patient verbalized understanding and agreement.       Constantino Leonard LMSW    [facility]    * [email or web address]

## 2021-06-10 NOTE — ED NOTES
TRANSFER - OUT REPORT:    Verbal report given to BIJAN Cervantes on [facility]  being transferred to [facility] [address] for routine progression of care       Report consisted of patients Situation, Background, Assessment and   Recommendations(SBAR). Information from the following report(s) SBAR and ED Summary was reviewed with the receiving nurse. Lines:   Peripheral IV 06/10/21 Anterior; Left Hand (Active)       Peripheral IV 06/10/21 Distal;Right Cephalic (Active)        Opportunity for questions and clarification was provided.       Patient transported with:   Monitor

## 2021-06-10 NOTE — ED NOTES
TRANSFER - OUT REPORT:    Verbal report given to BIJAN Cervantes on [facility]  being transferred to [facility] for routine progression of care       Report consisted of patients Situation, Background, Assessment and   Recommendations(SBAR). Information from the following report(s) SBAR and ED Summary was reviewed with the receiving nurse. Lines:   Peripheral IV 06/10/21 Anterior; Left Hand (Active)       Peripheral IV 06/10/21 Distal;Right Cephalic (Active)        Opportunity for questions and clarification was provided.       Patient transported with:   Monitor

## 2021-06-10 NOTE — ACP (ADVANCE CARE PLANNING)
Advance care planning-initial encounter      Face to face time -15 minutes face-to-face time spent discussion the care       Patient is able to make his/her own decisions:  [X] Yes  [ ] NO    Names of POA/surrogate decision maker when patient is altered  : Wife    Other members present in the meeting : Wife  Patient / surrogate decision-maker ultimately chose. .. [X] Full code- full aggressive medical and surgical interventions, including intubations, resuscitations, pressors, artificial tube feeding  [ ] DO NOT RESUSCITATE -okay to intubate,  and other aggressive medical and surgical intervention   [ ] Not resuscitate, DO NOT INTUBATE, but okay for other aggressive medical and surgical intervention   [ ] DNR/DNI, hospice, comfort focus care to maximize patient's quality of life  [ ] DNR/DNI, strict comfort care ONLY        Further discussion regarding principle disease process. prognosis, plans of care, and treatment goals  : Discussed in detail goal of care and patient wants to be full code.

## 2021-06-10 NOTE — PROGRESS NOTES
06/10/21 1535   Dual Skin Pressure Injury Assessment   Dual Skin Pressure Injury Assessment WDL   Second Care Provider (Based on [address] [place] [address]) BIJAN Thomas   Skin Integumentary   Skin Integumentary (WDL) WDL    Pressure  Injury Documentation No Pressure Injury Noted-Pressure Ulcer Prevention Initiated

## 2021-06-10 NOTE — PROGRESS NOTES
SW reviewed patient's chart and conducted a baseline assessment. Discharge plan at this time is as follows:     Care Management Interventions  PCP Verified by CM: Yes ([facility])  Mode of Transport at Discharge: Self  Transition of Care Consult (CM Consult): Discharge Planning  Current Support Network: Own Home, Lives with Spouse  Confirm Follow Up Transport: Family  Discharge Location  Discharge Placement: Home with family assistance      *Please note that discharge plans can change throughout an inpatient admission.  Ensure that you are referring to the most recent social work/nurse case management note for current discharge plan*     Juan Felix, [address] [place] [address] Work   [facility]    * [email or web address]

## 2021-06-10 NOTE — ED TRIAGE NOTES
Pt states he ate some \"raw\" chicken on Sat and has been vomiting since but is getting worse and now having abd pain. Denies diarrhea. Masked for triage.

## 2021-06-10 NOTE — PROGRESS NOTES
SW reviewed patient's chart and conducted a baseline assessment. Discharge plan at this time is as follows:     Care Management Interventions  PCP Verified by CM: Yes ([facility])  Mode of Transport at Discharge: Self  Transition of Care Consult (CM Consult): Discharge Planning  Current Support Network: Own Home, Lives with Spouse  Confirm Follow Up Transport: Family  Discharge Location  Discharge Placement: Home with family assistance      *Please note that discharge plans can change throughout an inpatient admission.  Ensure that you are referring to the most recent social work/nurse case management note for current discharge plan*     Rambo Segal, [address] [place] [address] Work   [facility]    * [email or web address]

## 2021-06-10 NOTE — H&P
[facility] Hospitalist Note     Admit Date:  6/10/2021 10:45 AM   Name:  Arias Boykin   Age:  38 y.o.  :  1985   MRN:  [id number]   PCP:  None  Treatment Team: Attending Provider: Regine Nobles MD; Primary Nurse: Franca Oliveros, RN; : Maria A Shirley    HPI/Subjective:   42-year-old male with past medical history significant for GERD presented to the ED with persistent nausea/vomiting/diarrhea and generalized abdominal pain. Patient reports symptoms started on Monday after eating chicken from a local [employer]. He states he is not able to keep anything down for 1 day. Reports several episodes of diarrhea and vomiting/day. Patient denies fever, chills, shortness of breath, chest pain, melena, hematochezia, dizziness, or dysuria. Denies any sick contact. He reports he drinks socially, last drink on . He smokes marijuana, last smoked today. In the ED patient was vitally stable, afebrile. Patient was given Zofran/Phenergan without improvement of persistent nausea/vomiting. Labs remarkable for leukocytosis. UDS positive for THC. CT abdomen findings suggestive of mild colitis. Hospitalist consulted for further management.     Assessment and Plan:     Gastroenteritis/colitis:  Symptoms started after eating chicken at a local [employer]  Patient with numerous episodes of vomiting in the ED, leukocytosis  CT abdomen with mild wall thickness of ascending colon  Blood culture obtained in the ED  UA negative for UTI  UDS positive for THC  Stool culture ordered  Check lactic acid  Patient was given 1 dose of Cipro/Flagyl in the ED, continue empirically for now  Protonix 40 mg IV twice daily  Sucralfate 4 times daily  Continue maintenance IV fluid  Pain control  Antiemetic  Clear liquid diet, hold if not able to tolerate    Elevated blood pressure:  Likely due to pain  Pain control  Monitor for now, if remains elevated will add antihypertensive    Marijuana use:      Discharge planning: Admit to medical floor    DVT ppx ordered  Code status:  Full  Estimated LOS: Less than 2 midnights  Risk:  high    Hospital Problems as of 6/10/2021 Never Reviewed        Codes Class Noted - Resolved POA    Gastroenteritis ICD-10-CM: K52.9  ICD-9-CM: 558.9  6/10/2021 - Present Unknown        Intractable nausea and vomiting ICD-10-CM: R11.2  ICD-9-CM: 536.2  6/10/2021 - Present Unknown                10 systems reviewed and negative except as noted in HPI. Past Medical History:   Diagnosis Date    Gastroenteritis 6/10/2021      History reviewed. No pertinent surgical history. No Known Allergies   Social History     Tobacco Use    Smoking status: Never Smoker   Substance Use Topics    Alcohol use: No      History reviewed. No pertinent family history. Family history reviewed and noncontributory. There is no immunization history on file for this patient. PTA Medications:  None       Objective:     Patient Vitals for the past 24 hrs:   Temp Pulse Resp BP SpO2   06/10/21 1043 97.7 °F (36.5 °C) 75 16 (!) 158/107 100 %     Oxygen Therapy  O2 Sat (%): 100 % (06/10/21 1043)  O2 Device: None (Room air) (06/10/21 1043)    Estimated body mass index is 35.62 kg/m² as calculated from the following:    Height as of this encounter: 6' 1\" (1.854 m). Weight as of this encounter: 122.5 kg (270 lb). No intake or output data in the 24 hours ending 06/10/21 1409    *Note that automatically entered I/Os may not be accurate; dependent on patient compliance with collection and accurate  by assistants. Physical Exam:  General:    Well nourished. Alert. Eyes:   Normal sclerae. Extraocular movements intact. HENT:  Normocephalic, atraumatic. Moist mucous membranes  CV:   RRR. No m/r/g. Lungs:  CTAB. No wheezing, rhonchi, or rales. Abdomen: Soft, tender to palpate mid abdomen, nondistended  Extremities: Warm and dry. No cyanosis or edema.   Neurologic: CN II-XII grossly intact. Sensation intact. Skin:     No rashes or jaundice. Normal coloration  Psych:  Normal mood and affect. I reviewed the labs, imaging, EKGs, telemetry, and other studies done this admission. Data Reviewed:   Recent Results (from the past 24 hour(s))   CBC WITH AUTOMATED DIFF    Collection Time: 06/10/21 11:01 AM   Result Value Ref Range    WBC 14.0 (H) 4.3 - 11.1 K/uL    RBC 5.05 4.23 - 5.6 M/uL    HGB 14.8 13.6 - 17.2 g/dL    HCT 44.6 41.1 - 50.3 %    MCV 88.3 79.6 - 97.8 FL    MCH 29.3 26.1 - 32.9 PG    MCHC 33.2 31.4 - 35.0 g/dL    RDW 13.3 11.9 - 14.6 %    PLATELET 844 103 - 663 K/uL    MPV 10.0 9.4 - 12.3 FL    ABSOLUTE NRBC 0.00 0.0 - 0.2 K/uL    DF AUTOMATED      NEUTROPHILS 68 43 - 78 %    LYMPHOCYTES 22 13 - 44 %    MONOCYTES 8 4.0 - 12.0 %    EOSINOPHILS 1 0.5 - 7.8 %    BASOPHILS 1 0.0 - 2.0 %    IMMATURE GRANULOCYTES 1 0.0 - 5.0 %    ABS. NEUTROPHILS 9.5 (H) 1.7 - 8.2 K/UL    ABS. LYMPHOCYTES 3.1 0.5 - 4.6 K/UL    ABS. MONOCYTES 1.2 0.1 - 1.3 K/UL    ABS. EOSINOPHILS 0.1 0.0 - 0.8 K/UL    ABS. BASOPHILS 0.1 0.0 - 0.2 K/UL    ABS. IMM. GRANS. 0.1 0.0 - 0.5 K/UL   METABOLIC PANEL, COMPREHENSIVE    Collection Time: 06/10/21 11:01 AM   Result Value Ref Range    Sodium 139 136 - 145 mmol/L    Potassium 4.7 3.5 - 5.1 mmol/L    Chloride 108 (H) 98 - 107 mmol/L    CO2 24 21 - 32 mmol/L    Anion gap 7 7 - 16 mmol/L    Glucose 116 (H) 65 - 100 mg/dL    BUN 9 6 - 23 MG/DL    Creatinine 0.94 0.8 - 1.5 MG/DL    GFR est AA >60 >60 ml/min/1.73m2    GFR est non-AA >60 >60 ml/min/1.73m2    Calcium 9.2 8.3 - 10.4 MG/DL    Bilirubin, total 0.5 0.2 - 1.1 MG/DL    ALT (SGPT) 42 12 - 65 U/L    AST (SGOT) 31 15 - 37 U/L    Alk.  phosphatase 53 50 - 136 U/L    Protein, total 7.5 6.3 - 8.2 g/dL    Albumin 3.7 3.5 - 5.0 g/dL    Globulin 3.8 (H) 2.3 - 3.5 g/dL    A-G Ratio 1.0 (L) 1.2 - 3.5     LIPASE    Collection Time: 06/10/21 11:01 AM   Result Value Ref Range    Lipase 101 73 - 393 U/L   URINALYSIS W/ RFLX MICROSCOPIC    Collection Time: 06/10/21  1:28 PM   Result Value Ref Range    Color YELLOW      Appearance CLEAR      Specific gravity 1.028 (H) 1.001 - 1.023      pH (UA) 8.0 5.0 - 9.0      Protein Negative NEG mg/dL    Glucose Negative mg/dL    Ketone 15 (A) NEG mg/dL    Bilirubin Negative NEG      Blood Negative NEG      Urobilinogen 0.2 0.2 - 1.0 EU/dL    Nitrites Negative NEG      Leukocyte Esterase Negative NEG         All Micro Results     Procedure Component Value Units Date/Time    CULTURE, BLOOD [[id number]]     Order Status: Sent Specimen: Blood     CULTURE, BLOOD [[id number]]     Order Status: Sent Specimen: Blood           Current Facility-Administered Medications   Medication Dose Route Frequency    sodium chloride (NS) flush 5-10 mL  5-10 mL IntraVENous Q8H    sodium chloride (NS) flush 5-10 mL  5-10 mL IntraVENous PRN    metroNIDAZOLE (FLAGYL) IVPB premix 500 mg  500 mg IntraVENous NOW    ciprofloxacin (CIPRO) 400 mg in D5W IVPB (premix)  400 mg IntraVENous ONCE    pantoprazole (PROTONIX) 40 mg in 0.9% sodium chloride 10 mL injection  40 mg IntraVENous Q12H    sucralfate (CARAFATE) tablet 1 g  1 g Oral AC&HS    ciprofloxacin (CIPRO) 400 mg in D5W IVPB (premix)  400 mg IntraVENous Q12H    metroNIDAZOLE (FLAGYL) IVPB premix 500 mg  500 mg IntraVENous Q8H    0.9% sodium chloride infusion  125 mL/hr IntraVENous CONTINUOUS    morphine injection 2 mg  2 mg IntraVENous Q6H PRN     No current outpatient medications on file. Other Studies:  No results found for this visit on 06/10/21.     CT ABD PELV W CONT    Result Date: 6/10/2021  CT abdomen and pelvis DATE: 6/10/2021 CLINICAL INFORMATION: Abdominal pain Spiral images of the abdomen and pelvis were obtained using 1 cc Isovue 370 intravenous contrast. All CT scanners at this facility use one or all of the following:  automated exposure control, adjustment of the mA and or kVp according to patient size, iterative reconstruction CT ABDOMEN: Upper images show calcified lymph node inferior left hilum. Spleen is normal. Liver is normal. No calcified gallstones. Normal pancreas. Normal adrenals. Normal kidneys. No hydronephrosis. Abdominal aorta is normal in size. CT PELVIS: No mass, adenopathy or abnormal fluid collection. No bowel obstruction. There does appear to be wall thickening in the ascending colon with no infiltration in the surrounding fat. This is nonspecific. 1. Mild wall thickening in the ascending colon. SARS-CoV-2 Lab Results  \"Novel Coronavirus\" Test: No results found for: COV2NT   \"Emergent Disease\" Test: No results found for: EDPR  \"SARS-COV-2\" Test: No results found for: XGCOVT  Rapid Test: No results found for: COVR            Part of this note was written by using a voice dictation software and the note has been proof read but may still contain some grammatical/other typographical errors.     Signed:  Jeannine Whitman MD

## 2021-06-10 NOTE — PROGRESS NOTES
SW met with patient to screen for discharge needs. Patient advises they are currently uninsured and are not established with primary care. SW provided verbal education on self-pay resources such as [employer], [employer], and [employer]. Patient urged to follow up with resources ASAP. All questions answered. Patient verbalized understanding and agreement.       Chase Wilcox LMSW    [facility]    * [email or web address]

## 2021-06-10 NOTE — PROGRESS NOTES
06/10/21 1535   Dual Skin Pressure Injury Assessment   Dual Skin Pressure Injury Assessment WDL   Second Care Provider (Based on [address] [place] [address]) BIJAN Thomas   Skin Integumentary   Skin Integumentary (WDL) WDL    Pressure  Injury Documentation No Pressure Injury Noted-Pressure Ulcer Prevention Initiated

## 2021-06-11 VITALS
BODY MASS INDEX: 35.78 KG/M2 | TEMPERATURE: 99.1 F | WEIGHT: 270 LBS | HEIGHT: 73 IN | SYSTOLIC BLOOD PRESSURE: 154 MMHG | DIASTOLIC BLOOD PRESSURE: 98 MMHG | RESPIRATION RATE: 20 BRPM | HEART RATE: 74 BPM | OXYGEN SATURATION: 97 %

## 2021-06-11 LAB
ANION GAP SERPL CALC-SCNC: 10 MMOL/L (ref 7–16)
BASOPHILS # BLD: 0 K/UL (ref 0–0.2)
BASOPHILS NFR BLD: 0 % (ref 0–2)
BUN SERPL-MCNC: 5 MG/DL (ref 6–23)
CALCIUM SERPL-MCNC: 8.6 MG/DL (ref 8.3–10.4)
CHLORIDE SERPL-SCNC: 106 MMOL/L (ref 98–107)
CO2 SERPL-SCNC: 22 MMOL/L (ref 21–32)
CREAT SERPL-MCNC: 0.79 MG/DL (ref 0.8–1.5)
DIFFERENTIAL METHOD BLD: ABNORMAL
EOSINOPHIL # BLD: 0 K/UL (ref 0–0.8)
EOSINOPHIL NFR BLD: 0 % (ref 0.5–7.8)
ERYTHROCYTE [DISTWIDTH] IN BLOOD BY AUTOMATED COUNT: 13.3 % (ref 11.9–14.6)
GLUCOSE SERPL-MCNC: 109 MG/DL (ref 65–100)
HCT VFR BLD AUTO: 45.1 % (ref 41.1–50.3)
HGB BLD-MCNC: 14.9 G/DL (ref 13.6–17.2)
IMM GRANULOCYTES # BLD AUTO: 0.1 K/UL (ref 0–0.5)
IMM GRANULOCYTES NFR BLD AUTO: 1 % (ref 0–5)
LYMPHOCYTES # BLD: 1.9 K/UL (ref 0.5–4.6)
LYMPHOCYTES NFR BLD: 10 % (ref 13–44)
MCH RBC QN AUTO: 29.3 PG (ref 26.1–32.9)
MCHC RBC AUTO-ENTMCNC: 33 G/DL (ref 31.4–35)
MCV RBC AUTO: 88.6 FL (ref 79.6–97.8)
MONOCYTES # BLD: 1.9 K/UL (ref 0.1–1.3)
MONOCYTES NFR BLD: 10 % (ref 4–12)
NEUTS SEG # BLD: 15.2 K/UL (ref 1.7–8.2)
NEUTS SEG NFR BLD: 79 % (ref 43–78)
NRBC # BLD: 0 K/UL (ref 0–0.2)
PLATELET # BLD AUTO: 297 K/UL (ref 150–450)
PMV BLD AUTO: 9.8 FL (ref 9.4–12.3)
POTASSIUM SERPL-SCNC: 3.4 MMOL/L (ref 3.5–5.1)
RBC # BLD AUTO: 5.09 M/UL (ref 4.23–5.6)
SODIUM SERPL-SCNC: 138 MMOL/L (ref 136–145)
WBC # BLD AUTO: 19.1 K/UL (ref 4.3–11.1)

## 2021-06-11 PROCEDURE — 96376 TX/PRO/DX INJ SAME DRUG ADON: CPT

## 2021-06-11 PROCEDURE — 80048 BASIC METABOLIC PNL TOTAL CA: CPT

## 2021-06-11 PROCEDURE — C9113 INJ PANTOPRAZOLE SODIUM, VIA: HCPCS | Performed by: FAMILY MEDICINE

## 2021-06-11 PROCEDURE — 2709999900 HC NON-CHARGEABLE SUPPLY

## 2021-06-11 PROCEDURE — 99218 HC RM OBSERVATION: CPT

## 2021-06-11 PROCEDURE — 74011250637 HC RX REV CODE- 250/637: Performed by: FAMILY MEDICINE

## 2021-06-11 PROCEDURE — 85025 COMPLETE CBC W/AUTO DIFF WBC: CPT

## 2021-06-11 PROCEDURE — 96372 THER/PROPH/DIAG INJ SC/IM: CPT

## 2021-06-11 PROCEDURE — 74011250636 HC RX REV CODE- 250/636: Performed by: FAMILY MEDICINE

## 2021-06-11 PROCEDURE — 36415 COLL VENOUS BLD VENIPUNCTURE: CPT

## 2021-06-11 RX ORDER — CIPROFLOXACIN 500 MG/1
500 TABLET ORAL 2 TIMES DAILY
Qty: 8 TABLET | Refills: 0 | Status: SHIPPED | OUTPATIENT
Start: 2021-06-11 | End: 2021-06-16

## 2021-06-11 RX ORDER — POTASSIUM CHLORIDE 20 MEQ/1
40 TABLET, EXTENDED RELEASE ORAL
Status: COMPLETED | OUTPATIENT
Start: 2021-06-11 | End: 2021-06-11

## 2021-06-11 RX ORDER — PANTOPRAZOLE SODIUM 40 MG/1
40 TABLET, DELAYED RELEASE ORAL 2 TIMES DAILY
Qty: 28 TABLET | Refills: 0 | Status: SHIPPED | OUTPATIENT
Start: 2021-06-11 | End: 2021-06-16

## 2021-06-11 RX ORDER — ONDANSETRON 4 MG/1
4 TABLET, ORALLY DISINTEGRATING ORAL
Qty: 15 TABLET | Refills: 0 | Status: SHIPPED | OUTPATIENT
Start: 2021-06-11 | End: 2021-06-16

## 2021-06-11 RX ORDER — METRONIDAZOLE 500 MG/1
500 TABLET ORAL 3 TIMES DAILY
Qty: 12 TABLET | Refills: 0 | Status: SHIPPED | OUTPATIENT
Start: 2021-06-11 | End: 2021-06-16

## 2021-06-11 RX ORDER — SUCRALFATE 1 G/1
1 TABLET ORAL
Qty: 28 TABLET | Refills: 0 | Status: SHIPPED | OUTPATIENT
Start: 2021-06-11 | End: 2021-06-16

## 2021-06-11 RX ADMIN — ONDANSETRON 4 MG: 4 TABLET, ORALLY DISINTEGRATING ORAL at 06:38

## 2021-06-11 RX ADMIN — CIPROFLOXACIN 400 MG: 2 INJECTION, SOLUTION INTRAVENOUS at 02:31

## 2021-06-11 RX ADMIN — POTASSIUM CHLORIDE 40 MEQ: 1500 TABLET, EXTENDED RELEASE ORAL at 08:02

## 2021-06-11 RX ADMIN — Medication 10 ML: at 06:06

## 2021-06-11 RX ADMIN — ENOXAPARIN SODIUM 40 MG: 40 INJECTION SUBCUTANEOUS at 07:37

## 2021-06-11 RX ADMIN — SUCRALFATE 1 G: 1 TABLET ORAL at 11:47

## 2021-06-11 RX ADMIN — SODIUM CHLORIDE 40 MG: 9 INJECTION, SOLUTION INTRAMUSCULAR; INTRAVENOUS; SUBCUTANEOUS at 07:37

## 2021-06-11 RX ADMIN — SUCRALFATE 1 G: 1 TABLET ORAL at 06:05

## 2021-06-11 RX ADMIN — METRONIDAZOLE 500 MG: 500 INJECTION, SOLUTION INTRAVENOUS at 05:55

## 2021-06-11 NOTE — PROGRESS NOTES
Care Management Interventions  PCP Verified by CM: Yes (set up appointment with [employer] [facility] for June 23 at 140 pm )  Palliative Care Criteria Met (RRAT>21 & CHF Dx)?: No (Dx Gastroenteritis )  Mode of Transport at Discharge: Other (see comment) (wife)  Transition of Care Consult (CM Consult): Discharge Planning  Discharge Durable Medical Equipment: No (none)  Physical Therapy Consult: No  Occupational Therapy Consult: No  Speech Therapy Consult: No  Current Support Network: Lives with Spouse  Confirm Follow Up Transport: Self  Discharge Location  Discharge Placement: Home with family assistance  Interdisciplinary rounds with Dr. Jose Washington, CM, nursing, dietary and PT for plan of care. Patient is medically stable for d/c today. CM met with patient for d/c plans. Prior to admission patient lives with his wife independent of ADL's and requires no DME. He voices no concerns or needs for d/c. CM provided him with self pay packet to include [employer], [employer], [address] [place] [address] and [employer] in [employer]. Discussed importance of following up with a PCP and [facility], [facility] or Clinic. He would like referral to [employer] [facility]. CM contacted them and spoke with Jeannine for appointments and set up one for June 23 at 140 pm. Patient has been provided with this information and states \"I will follow up with them\". Patient on 5 medications for d/c Cipro $6, Flagyl 10.82 , Protonix 10.62 Carafate 10.44 and Zofran 11.79 with the coupon and good rx at [facility]. CM asked patient if he thought he would be able to afford approximately $50 or would he need medication assistance and that CM would assist with medications. He stated that he was able to afford the $50. CM provided patient with Good Rx card and coupons for [facility]. Patient to d/c home with spouse.

## 2021-06-11 NOTE — PROGRESS NOTES
[address] June 11, 2021       RE: Micha Curtis      To Whom It May Concern,    This is to certify that Micha Curtis was admitted 6/10-6/11 at [facility]     Please feel free to contact my office if you have any questions or concerns. Thank you for your assistance in this matter.       Sincerely,  Ian Foster RN

## 2021-06-11 NOTE — PROGRESS NOTES
Care Management Interventions  PCP Verified by CM: Yes (set up appointment with [employer] for June 23 at 140 pm )  Palliative Care Criteria Met (RRAT>21 & CHF Dx)?: No (Dx Gastroenteritis )  Mode of Transport at Discharge: Other (see comment) (wife)  Transition of Care Consult (CM Consult): Discharge Planning  Discharge Durable Medical Equipment: No (none)  Physical Therapy Consult: No  Occupational Therapy Consult: No  Speech Therapy Consult: No  Current Support Network: Lives with Spouse  Confirm Follow Up Transport: Self  Discharge Location  Discharge Placement: Home with family assistance  Interdisciplinary rounds with Dr. Savannah De La Cruz, CM, nursing, dietary and PT for plan of care. Patient is medically stable for d/c today. CM met with patient for d/c plans. Prior to admission patient lives with his wife independent of ADL's and requires no DME. He voices no concerns or needs for d/c. CM provided him with self pay packet to include [employer], [employer], [address] [place] [address] and [employer]. Discussed importance of following up with a PCP and Counts include 234 beds at the [facility], [facility] or [facility]. He would like referral to [facility] [employer]. CM contacted them and spoke with Jeannine for appointments and set up one for June 23 at 140 pm. Patient has been provided with this information and states \"I will follow up with them\". Patient on 5 medications for d/c Cipro $6, Flagyl 10.82 , Protonix 10.62 Carafate 10.44 and Zofran 11.79 with the coupon and good rx at [facility]. CM asked patient if he thought he would be able to afford approximately $50 or would he need medication assistance and that CM would assist with medications. He stated that he was able to afford the $50. CM provided patient with Good Rx card and coupons for [facility]. Patient to d/c home with spouse.

## 2021-06-11 NOTE — PROGRESS NOTES
Tiigi 34 June 11, 2021       RE: Be Ambrosio      To Whom It May Concern,    This is to certify that Be Ambrosio was admitted 6/10-6/11 at [facility]     Please feel free to contact my office if you have any questions or concerns. Thank you for your assistance in this matter.       Sincerely,  Delaney Abbott RN

## 2021-06-11 NOTE — PROGRESS NOTES
Problem: Pressure Injury - Risk of  Goal: *Prevention of pressure injury  Description: Document Ildefonso Scale and appropriate interventions in the flowsheet.   Outcome: Progressing Towards Goal  Note: Pressure Injury Interventions:       Moisture Interventions: Maintain skin hydration (lotion/cream)    Activity Interventions: Increase time out of bed    Mobility Interventions: Pressure redistribution bed/mattress (bed type)    Nutrition Interventions: Document food/fluid/supplement intake

## 2021-06-11 NOTE — PROGRESS NOTES
END OF SHIFT NOTE:    Intake/Output  No intake/output data recorded. Voiding: YES  Catheter: NO    Stool:  0 occurrences. Emesis:  0 occurrences. VITAL SIGNS  Patient Vitals for the past 12 hrs:   Temp Pulse Resp BP SpO2   06/11/21 0226 99.4 °F (37.4 °C) 76 20 (!) 149/88 96 %   06/10/21 2245 98.1 °F (36.7 °C) 86 20 136/77 100 %   06/10/21 1937 98 °F (36.7 °C) 65 18 (!) 175/93 100 %       Pain Assessment  Pain 1  Pain Scale 1: Visual (06/10/21 2110)  Pain Intensity 1: 0 (06/10/21 2110)  Patient Stated Pain Goal: 0 (06/10/21 2015)  Pain Reassessment 1: Patient resting w/respiratory rate greater than 10 (06/10/21 2110)  Pain Onset 1: PTA (06/10/21 2015)  Pain Location 1: Abdomen (06/10/21 2015)  Pain Orientation 1: Mid (06/10/21 2015)  Pain Description 1: Aching;Constant (06/10/21 2015)  Pain Intervention(s) 1: Medication (see MAR) (06/10/21 2015)    Ambulating  Yes    Additional Information:   - no bowel movement, stool sample still needed   - pt complaining of nausea throughout shift, PRN zofran 2x   - PRN morphine 1x        Shift report given to oncoming nurse at the bedside.     Veverly DEQUAN Castañeda

## 2021-06-11 NOTE — DISCHARGE SUMMARY
Hospitalist Discharge Summary     Admit Date:  6/10/2021 10:45 AM   DC note date: 2021  Name:  Hiren Lees   Age:  38 y.o.  :  1985   MRN:  [id number]   PCP:  None  Treatment Team: Attending Provider: Derek Rebolledo MD; : Faye Sewell    Problem List for this Hospitalization:  Hospital Problems as of 2021 Never Reviewed        Codes Class Noted - Resolved POA    * (Principal) Gastroenteritis ICD-10-CM: K52.9  ICD-9-CM: 558.9  6/10/2021 - Present Unknown        Intractable nausea and vomiting ICD-10-CM: R11.2  ICD-9-CM: 536.2  6/10/2021 - Present Unknown                Admission HPI from 6/10/2021:    \" 50-year-old male with past medical history significant for GERD presented to the ED with persistent nausea/vomiting/diarrhea and generalized abdominal pain. Patient reports symptoms started on Monday after eating chicken from a local [place]. He states he is not able to keep anything down for 1 day. Reports several episodes of diarrhea and vomiting/day. Patient denies fever, chills, shortness of breath, chest pain, melena, hematochezia, dizziness, or dysuria. Denies any sick contact. He reports he drinks socially, last drink on . He smokes marijuana, last smoked today.     In the ED patient was vitally stable, afebrile. Patient was given Zofran/Phenergan without improvement of persistent nausea/vomiting. Labs remarkable for leukocytosis. UDS positive for THC. CT abdomen findings suggestive of mild colitis. Hospitalist consulted for further management. \"    Hospital Course:  Patient is admitted with intractable nausea/vomitings and generalized abdominal pain, likely secondary to gastroenteritis. CT abdominal findings suggestive of mild colitis. Patient started on empiric IV Cipro/Flagyl. Also with significant history of GERD and gastritis. He is started on Protonix and sucralfate.   Initially started on clear liquid diet and later diet advanced as tolerated. Patient symptoms improved significantly. No more vomiting, diarrhea is resolving and abdominal pain has been improved. Leukocytosis could be reactive in the setting of intractable vomiting versus infectious etiology. Patient is improved clinically, will continue oral antibiotic to complete course. He is stable to discharge home today. Needs to follow-up with PCP. Disposition: Home or Self Care  Activity: Activity as tolerated  Diet: ADULT DIET Dysphagia - Soft & Bite Sized  Code Status: Full Code    Follow Up Orders: Follow-up Appointments   Procedures    FOLLOW UP VISIT Appointment in: 3 - 5 Days PCP     PCP     Standing Status:   Standing     Number of Occurrences:   1     Order Specific Question:   Appointment in     Answer:   3 - 5 Days       Follow-up Information     Follow up With Specialties Details Why Contact Info    None    None ([phone]) Patient stated that they have no PCP            Discharge meds at bottom of this note. Plan was discussed with patient and wife. All questions answered. Patient was stable at time of discharge. Given instructions to call a physician or return if any concerns. Discharge summary and encounter summary was sent to PCP electronically via \"Comm [employer]\" link in [email or web address] [facility], if possible. Diagnostic Imaging/Tests:   CT ABD PELV W CONT    Result Date: 6/10/2021  CT abdomen and pelvis DATE: 6/10/2021 CLINICAL INFORMATION: Abdominal pain Spiral images of the abdomen and pelvis were obtained using 1 cc Isovue 370 intravenous contrast. All CT scanners at this facility use one or all of the following:  automated exposure control, adjustment of the mA and or kVp according to patient size, iterative reconstruction CT ABDOMEN: Upper images show calcified lymph node inferior left hilum. Spleen is normal. Liver is normal. No calcified gallstones. Normal pancreas. Normal adrenals. Normal kidneys. No hydronephrosis. Abdominal aorta is normal in size.  CT PELVIS: No mass, adenopathy or abnormal fluid collection. No bowel obstruction. There does appear to be wall thickening in the ascending colon with no infiltration in the surrounding fat. This is nonspecific. 1. Mild wall thickening in the ascending colon. Echocardiogram results:  No results found for this visit on 06/10/21.     Procedures done this admission:  * No surgery found *    All Micro Results     Procedure Component Value Units Date/Time    CULTURE, BLOOD [[id number]] Collected: 06/10/21 1403    Order Status: Completed Specimen: Blood Updated: 06/11/21 0705     Special Requests: RIGHT ANTECUBITAL        Culture result: NO GROWTH AFTER 16 HOURS       CULTURE, BLOOD [[id number]] Collected: 06/10/21 2055    Order Status: Completed Specimen: Blood Updated: 06/11/21 0705     Special Requests: --        LEFT  Antecubital       Culture result: NO GROWTH AFTER 9 HOURS       CULTURE, STOOL [[id number]]     Order Status: Sent Specimen: Stool           SARS-CoV-2 Lab Results  \"Novel Coronavirus\" Test: No results found for: COV2NT   \"Emergent Disease\" Test: No results found for: EDPR  \"SARS-COV-2\" Test: No results found for: XGCOVT  Rapid Test: No results found for: COVR         Labs: Results:       BMP, Mg, Phos Recent Labs     06/11/21  0605 06/10/21  1101    139   K 3.4* 4.7    108*   CO2 22 24   AGAP 10 7   BUN 5* 9   CREA 0.79* 0.94   CA 8.6 9.2   * 116*      CBC Recent Labs     06/11/21  0759 06/10/21  1101   WBC 19.1* 14.0*   RBC 5.09 5.05   HGB 14.9 14.8   HCT 45.1 44.6    311   GRANS 79* 68   LYMPH 10* 22   EOS 0* 1   MONOS 10 8   BASOS 0 1   IG 1 1   ANEU 15.2* 9.5*   ABL 1.9 3.1   NISSA 0.0 0.1   ABM 1.9* 1.2   ABB 0.0 0.1   AIG 0.1 0.1      LFT Recent Labs     06/10/21  1101   ALT 42   AP 53   TP 7.5   ALB 3.7   GLOB 3.8*   AGRAT 1.0*      Cardiac Testing No results found for: BNPP, BNP, CPK, RCK1, RCK2, RCK3, RCK4, CKMB, CKNDX, CKND1, TROPT, TROIQ   Coagulation Tests No results found for: PTP, INR, APTT, INREXT   A1c No results found for: HBA1C, [id number]   Lipid Panel No results found for: CHOL, CHOLPOCT, CHOLX, CHLST, CHOLV, 056360, HDL, HDLP, LDL, LDLC, DLDLP, 426899, VLDLC, VLDL, TGLX, TRIGL, TRIGP, TGLPOCT, CHHD, CHHDX   Thyroid Panel No results found for: TSH, T4, FT4, TT3, T3U, TSHEXT     Most Recent UA Lab Results   Component Value Date/Time    Color YELLOW 06/10/2021 01:28 PM    Appearance CLEAR 06/10/2021 01:28 PM    Specific gravity 1.028 (H) 06/10/2021 01:28 PM    pH (UA) 8.0 06/10/2021 01:28 PM    Protein Negative 06/10/2021 01:28 PM    Glucose Negative 06/10/2021 01:28 PM    Ketone 15 (A) 06/10/2021 01:28 PM    Bilirubin Negative 06/10/2021 01:28 PM    Blood Negative 06/10/2021 01:28 PM    Urobilinogen 0.2 06/10/2021 01:28 PM    Nitrites Negative 06/10/2021 01:28 PM    Leukocyte Esterase Negative 06/10/2021 01:28 PM        No Known Allergies    There is no immunization history on file for this patient. All Labs from Last 24 Hrs:  Recent Results (from the past 24 hour(s))   CBC WITH AUTOMATED DIFF    Collection Time: 06/10/21 11:01 AM   Result Value Ref Range    WBC 14.0 (H) 4.3 - 11.1 K/uL    RBC 5.05 4.23 - 5.6 M/uL    HGB 14.8 13.6 - 17.2 g/dL    HCT 44.6 41.1 - 50.3 %    MCV 88.3 79.6 - 97.8 FL    MCH 29.3 26.1 - 32.9 PG    MCHC 33.2 31.4 - 35.0 g/dL    RDW 13.3 11.9 - 14.6 %    PLATELET 524 625 - 868 K/uL    MPV 10.0 9.4 - 12.3 FL    ABSOLUTE NRBC 0.00 0.0 - 0.2 K/uL    DF AUTOMATED      NEUTROPHILS 68 43 - 78 %    LYMPHOCYTES 22 13 - 44 %    MONOCYTES 8 4.0 - 12.0 %    EOSINOPHILS 1 0.5 - 7.8 %    BASOPHILS 1 0.0 - 2.0 %    IMMATURE GRANULOCYTES 1 0.0 - 5.0 %    ABS. NEUTROPHILS 9.5 (H) 1.7 - 8.2 K/UL    ABS. LYMPHOCYTES 3.1 0.5 - 4.6 K/UL    ABS. MONOCYTES 1.2 0.1 - 1.3 K/UL    ABS. EOSINOPHILS 0.1 0.0 - 0.8 K/UL    ABS. BASOPHILS 0.1 0.0 - 0.2 K/UL    ABS. IMM.  GRANS. 0.1 0.0 - 0.5 K/UL   METABOLIC PANEL, COMPREHENSIVE    Collection Time: 06/10/21 11:01 AM   Result Value Ref Range Sodium 139 136 - 145 mmol/L    Potassium 4.7 3.5 - 5.1 mmol/L    Chloride 108 (H) 98 - 107 mmol/L    CO2 24 21 - 32 mmol/L    Anion gap 7 7 - 16 mmol/L    Glucose 116 (H) 65 - 100 mg/dL    BUN 9 6 - 23 MG/DL    Creatinine 0.94 0.8 - 1.5 MG/DL    GFR est AA >60 >60 ml/min/1.73m2    GFR est non-AA >60 >60 ml/min/1.73m2    Calcium 9.2 8.3 - 10.4 MG/DL    Bilirubin, total 0.5 0.2 - 1.1 MG/DL    ALT (SGPT) 42 12 - 65 U/L    AST (SGOT) 31 15 - 37 U/L    Alk.  phosphatase 53 50 - 136 U/L    Protein, total 7.5 6.3 - 8.2 g/dL    Albumin 3.7 3.5 - 5.0 g/dL    Globulin 3.8 (H) 2.3 - 3.5 g/dL    A-G Ratio 1.0 (L) 1.2 - 3.5     LIPASE    Collection Time: 06/10/21 11:01 AM   Result Value Ref Range    Lipase 101 73 - 393 U/L   DRUG SCREEN, URINE    Collection Time: 06/10/21  1:28 PM   Result Value Ref Range    PCP(PHENCYCLIDINE) Negative      BENZODIAZEPINES Negative      COCAINE Negative      AMPHETAMINES Negative      METHADONE Negative      THC (TH-CANNABINOL) Positive      OPIATES Negative      BARBITURATES Negative     URINALYSIS W/ RFLX MICROSCOPIC    Collection Time: 06/10/21  1:28 PM   Result Value Ref Range    Color YELLOW      Appearance CLEAR      Specific gravity 1.028 (H) 1.001 - 1.023      pH (UA) 8.0 5.0 - 9.0      Protein Negative NEG mg/dL    Glucose Negative mg/dL    Ketone 15 (A) NEG mg/dL    Bilirubin Negative NEG      Blood Negative NEG      Urobilinogen 0.2 0.2 - 1.0 EU/dL    Nitrites Negative NEG      Leukocyte Esterase Negative NEG     CULTURE, BLOOD    Collection Time: 06/10/21  2:03 PM    Specimen: Blood   Result Value Ref Range    Special Requests: RIGHT ANTECUBITAL      Culture result: NO GROWTH AFTER 16 HOURS     CULTURE, BLOOD    Collection Time: 06/10/21  8:55 PM    Specimen: Blood   Result Value Ref Range    Special Requests: LEFT  Antecubital        Culture result: NO GROWTH AFTER 9 HOURS     LACTIC ACID    Collection Time: 06/10/21  8:55 PM   Result Value Ref Range    Lactic acid 1.4 0.4 - 2.0 MMOL/L   METABOLIC PANEL, BASIC    Collection Time: 06/11/21  6:05 AM   Result Value Ref Range    Sodium 138 136 - 145 mmol/L    Potassium 3.4 (L) 3.5 - 5.1 mmol/L    Chloride 106 98 - 107 mmol/L    CO2 22 21 - 32 mmol/L    Anion gap 10 7 - 16 mmol/L    Glucose 109 (H) 65 - 100 mg/dL    BUN 5 (L) 6 - 23 MG/DL    Creatinine 0.79 (L) 0.8 - 1.5 MG/DL    GFR est AA >60 >60 ml/min/1.73m2    GFR est non-AA >60 >60 ml/min/1.73m2    Calcium 8.6 8.3 - 10.4 MG/DL   CBC WITH AUTOMATED DIFF    Collection Time: 06/11/21  7:59 AM   Result Value Ref Range    WBC 19.1 (H) 4.3 - 11.1 K/uL    RBC 5.09 4.23 - 5.6 M/uL    HGB 14.9 13.6 - 17.2 g/dL    HCT 45.1 41.1 - 50.3 %    MCV 88.6 79.6 - 97.8 FL    MCH 29.3 26.1 - 32.9 PG    MCHC 33.0 31.4 - 35.0 g/dL    RDW 13.3 11.9 - 14.6 %    PLATELET 325 492 - 965 K/uL    MPV 9.8 9.4 - 12.3 FL    ABSOLUTE NRBC 0.00 0.0 - 0.2 K/uL    DF AUTOMATED      NEUTROPHILS 79 (H) 43 - 78 %    LYMPHOCYTES 10 (L) 13 - 44 %    MONOCYTES 10 4.0 - 12.0 %    EOSINOPHILS 0 (L) 0.5 - 7.8 %    BASOPHILS 0 0.0 - 2.0 %    IMMATURE GRANULOCYTES 1 0.0 - 5.0 %    ABS. NEUTROPHILS 15.2 (H) 1.7 - 8.2 K/UL    ABS. LYMPHOCYTES 1.9 0.5 - 4.6 K/UL    ABS. MONOCYTES 1.9 (H) 0.1 - 1.3 K/UL    ABS. EOSINOPHILS 0.0 0.0 - 0.8 K/UL    ABS. BASOPHILS 0.0 0.0 - 0.2 K/UL    ABS. IMM.  GRANS. 0.1 0.0 - 0.5 K/UL       Discharge Exam:  Patient Vitals for the past 24 hrs:   Temp Pulse Resp BP SpO2   06/11/21 0732 98.4 °F (36.9 °C) 86 20 (!) 141/90 100 %   06/11/21 0226 99.4 °F (37.4 °C) 76 20 (!) 149/88 96 %   06/10/21 2245 98.1 °F (36.7 °C) 86 20 136/77 100 %   06/10/21 1937 98 °F (36.7 °C) 65 18 (!) 175/93 100 %   06/10/21 1537 98.4 °F (36.9 °C) 62 20 (!) 160/95 100 %   06/10/21 1043 97.7 °F (36.5 °C) 75 16 (!) 158/107 100 %     Oxygen Therapy  O2 Sat (%): 100 % (06/11/21 0732)  O2 Device: None (Room air) (06/10/21 1930)    Estimated body mass index is 35.62 kg/m² as calculated from the following:    Height as of this encounter: 6' 1\" (1.854 m). Weight as of this encounter: 122.5 kg (270 lb). Intake/Output Summary (Last 24 hours) at 6/11/2021 1039  Last data filed at 6/11/2021 0841  Gross per 24 hour   Intake 2073 ml   Output 100 ml   Net 1973 ml       *Note that automatically entered I/Os may not be accurate; dependent on patient compliance with collection and accurate  by assistants. General:    Well nourished. Alert. Eyes:   Normal sclerae. Extraocular movements intact. ENT:  Normocephalic, atraumatic. Moist mucous membranes  CV:   Regular rate and rhythm. No murmur, rub, or gallop. Lungs:  Clear to auscultation bilaterally. No wheezing, rhonchi, or rales. Abdomen: Soft, nontender, nondistended. Extremities: Warm and dry. No cyanosis or edema. Neurologic: CN II-XII grossly intact. No gross focal deficits   Skin:     No rashes or jaundice. Psych:  Normal mood and affect.     Current Med List in Hospital:   Current Facility-Administered Medications   Medication Dose Route Frequency    sodium chloride (NS) flush 5-10 mL  5-10 mL IntraVENous Q8H    sodium chloride (NS) flush 5-10 mL  5-10 mL IntraVENous PRN    sodium chloride (NS) flush 5-40 mL  5-40 mL IntraVENous Q8H    sodium chloride (NS) flush 5-40 mL  5-40 mL IntraVENous PRN    acetaminophen (TYLENOL) tablet 650 mg  650 mg Oral Q6H PRN    Or    acetaminophen (TYLENOL) suppository 650 mg  650 mg Rectal Q6H PRN    polyethylene glycol (MIRALAX) packet 17 g  17 g Oral DAILY PRN    ondansetron (ZOFRAN ODT) tablet 4 mg  4 mg Oral Q8H PRN    Or    ondansetron (ZOFRAN) injection 4 mg  4 mg IntraVENous Q6H PRN    enoxaparin (LOVENOX) injection 40 mg  40 mg SubCUTAneous DAILY    pantoprazole (PROTONIX) 40 mg in 0.9% sodium chloride 10 mL injection  40 mg IntraVENous Q12H    sucralfate (CARAFATE) tablet 1 g  1 g Oral AC&HS    ciprofloxacin (CIPRO) 400 mg in D5W IVPB (premix)  400 mg IntraVENous Q12H    metroNIDAZOLE (FLAGYL) IVPB premix 500 mg  500 mg IntraVENous Q8H    0.9% sodium chloride infusion  125 mL/hr IntraVENous CONTINUOUS    morphine injection 2 mg  2 mg IntraVENous Q6H PRN       Discharge Info:   Current Discharge Medication List      START taking these medications    Details   ciprofloxacin HCl (CIPRO) 500 mg tablet Take 1 Tablet by mouth two (2) times a day for 4 days. Qty: 8 Tablet, Refills: 0  Start date: 6/11/2021, End date: 6/15/2021      metroNIDAZOLE (FLAGYL) 500 mg tablet Take 1 Tablet by mouth three (3) times daily for 4 days. Qty: 12 Tablet, Refills: 0  Start date: 6/11/2021, End date: 6/15/2021      pantoprazole (PROTONIX) 40 mg tablet Take 1 Tablet by mouth two (2) times a day for 14 days. Qty: 28 Tablet, Refills: 0  Start date: 6/11/2021, End date: 6/25/2021      sucralfate (CARAFATE) 1 gram tablet Take 1 Tablet by mouth Before breakfast, lunch, dinner and at bedtime for 7 days. Qty: 28 Tablet, Refills: 0  Start date: 6/11/2021, End date: 6/18/2021      ondansetron (ZOFRAN ODT) 4 mg disintegrating tablet Take 1 Tablet by mouth every eight (8) hours as needed for Nausea or Vomiting for up to 5 days. Qty: 15 Tablet, Refills: 0  Start date: 6/11/2021, End date: 6/16/2021               Time spent in patient discharge planning and coordination 36 minutes.     Signed:  Deana Porter MD

## 2021-06-11 NOTE — PROGRESS NOTES
END OF SHIFT NOTE:    Intake/Output  No intake/output data recorded. Voiding: YES  Catheter: NO    Stool:  0 occurrences. Emesis:  0 occurrences. VITAL SIGNS  Patient Vitals for the past 12 hrs:   Temp Pulse Resp BP SpO2   06/11/21 0226 99.4 °F (37.4 °C) 76 20 (!) 149/88 96 %   06/10/21 2245 98.1 °F (36.7 °C) 86 20 136/77 100 %   06/10/21 1937 98 °F (36.7 °C) 65 18 (!) 175/93 100 %       Pain Assessment  Pain 1  Pain Scale 1: Visual (06/10/21 2110)  Pain Intensity 1: 0 (06/10/21 2110)  Patient Stated Pain Goal: 0 (06/10/21 2015)  Pain Reassessment 1: Patient resting w/respiratory rate greater than 10 (06/10/21 2110)  Pain Onset 1: PTA (06/10/21 2015)  Pain Location 1: Abdomen (06/10/21 2015)  Pain Orientation 1: Mid (06/10/21 2015)  Pain Description 1: Aching;Constant (06/10/21 2015)  Pain Intervention(s) 1: Medication (see MAR) (06/10/21 2015)    Ambulating  Yes    Additional Information:   - no bowel movement, stool sample still needed   - pt complaining of nausea throughout shift, PRN zofran 2x   - PRN morphine 1x        Shift report given to oncoming nurse at the bedside.     DEQUAN Waters

## 2021-06-13 ENCOUNTER — HOSPITAL ENCOUNTER (INPATIENT)
Age: 36
LOS: 3 days | Discharge: HOME | DRG: 368 | End: 2021-06-16
Attending: EMERGENCY MEDICINE | Admitting: HOSPITALIST
Payer: COMMERCIAL

## 2021-06-13 DIAGNOSIS — K29.01 GASTROINTESTINAL HEMORRHAGE ASSOCIATED WITH ACUTE GASTRITIS: Primary | ICD-10-CM

## 2021-06-13 PROBLEM — K92.2 GI BLEED: Status: ACTIVE | Noted: 2021-06-13

## 2021-06-13 LAB
ALBUMIN SERPL-MCNC: 4.1 G/DL (ref 3.5–5)
ALBUMIN/GLOB SERPL: 1.1 (ref 1.2–3.5)
ALP SERPL-CCNC: 58 U/L (ref 50–136)
ALT SERPL-CCNC: 57 U/L (ref 12–65)
ANION GAP SERPL CALC-SCNC: 11 MMOL/L (ref 7–16)
AST SERPL-CCNC: 24 U/L (ref 15–37)
BACTERIA URNS QL MICRO: 0 /HPF
BASOPHILS # BLD: 0.1 K/UL (ref 0–0.2)
BASOPHILS NFR BLD: 1 % (ref 0–2)
BILIRUB SERPL-MCNC: 0.7 MG/DL (ref 0.2–1.1)
BUN SERPL-MCNC: 13 MG/DL (ref 6–23)
CALCIUM SERPL-MCNC: 8.9 MG/DL (ref 8.3–10.4)
CASTS URNS QL MICRO: 0 /LPF
CHLORIDE SERPL-SCNC: 103 MMOL/L (ref 98–107)
CO2 SERPL-SCNC: 22 MMOL/L (ref 21–32)
CREAT SERPL-MCNC: 1.17 MG/DL (ref 0.8–1.5)
CRYSTALS URNS QL MICRO: 0 /LPF
DIFFERENTIAL METHOD BLD: ABNORMAL
EOSINOPHIL # BLD: 0.1 K/UL (ref 0–0.8)
EOSINOPHIL NFR BLD: 0 % (ref 0.5–7.8)
EPI CELLS #/AREA URNS HPF: ABNORMAL /HPF
ERYTHROCYTE [DISTWIDTH] IN BLOOD BY AUTOMATED COUNT: 12.9 % (ref 11.9–14.6)
GLOBULIN SER CALC-MCNC: 3.8 G/DL (ref 2.3–3.5)
GLUCOSE SERPL-MCNC: 144 MG/DL (ref 65–100)
HCT VFR BLD AUTO: 47.5 % (ref 41.1–50.3)
HGB BLD-MCNC: 16.2 G/DL (ref 13.6–17.2)
IMM GRANULOCYTES # BLD AUTO: 0.2 K/UL (ref 0–0.5)
IMM GRANULOCYTES NFR BLD AUTO: 1 % (ref 0–5)
LIPASE SERPL-CCNC: 379 U/L (ref 73–393)
LYMPHOCYTES # BLD: 2.6 K/UL (ref 0.5–4.6)
LYMPHOCYTES NFR BLD: 13 % (ref 13–44)
MCH RBC QN AUTO: 29.4 PG (ref 26.1–32.9)
MCHC RBC AUTO-ENTMCNC: 34.1 G/DL (ref 31.4–35)
MCV RBC AUTO: 86.2 FL (ref 79.6–97.8)
MONOCYTES # BLD: 1.7 K/UL (ref 0.1–1.3)
MONOCYTES NFR BLD: 8 % (ref 4–12)
MUCOUS THREADS URNS QL MICRO: ABNORMAL /LPF
NEUTS SEG # BLD: 15.6 K/UL (ref 1.7–8.2)
NEUTS SEG NFR BLD: 77 % (ref 43–78)
NRBC # BLD: 0 K/UL (ref 0–0.2)
PLATELET # BLD AUTO: 328 K/UL (ref 150–450)
PMV BLD AUTO: 9.7 FL (ref 9.4–12.3)
POTASSIUM SERPL-SCNC: 3.2 MMOL/L (ref 3.5–5.1)
PROT SERPL-MCNC: 7.9 G/DL (ref 6.3–8.2)
RBC # BLD AUTO: 5.51 M/UL (ref 4.23–5.6)
RBC #/AREA URNS HPF: 0 /HPF
SODIUM SERPL-SCNC: 136 MMOL/L (ref 136–145)
WBC # BLD AUTO: 20.2 K/UL (ref 4.3–11.1)
WBC URNS QL MICRO: ABNORMAL /HPF

## 2021-06-13 PROCEDURE — 74011250636 HC RX REV CODE- 250/636: Performed by: HOSPITALIST

## 2021-06-13 PROCEDURE — 83690 ASSAY OF LIPASE: CPT

## 2021-06-13 PROCEDURE — 96361 HYDRATE IV INFUSION ADD-ON: CPT

## 2021-06-13 PROCEDURE — 96374 THER/PROPH/DIAG INJ IV PUSH: CPT

## 2021-06-13 PROCEDURE — 74011000250 HC RX REV CODE- 250: Performed by: EMERGENCY MEDICINE

## 2021-06-13 PROCEDURE — 65270000029 HC RM PRIVATE

## 2021-06-13 PROCEDURE — 85025 COMPLETE CBC W/AUTO DIFF WBC: CPT

## 2021-06-13 PROCEDURE — 96375 TX/PRO/DX INJ NEW DRUG ADDON: CPT

## 2021-06-13 PROCEDURE — 80053 COMPREHEN METABOLIC PANEL: CPT

## 2021-06-13 PROCEDURE — 81015 MICROSCOPIC EXAM OF URINE: CPT

## 2021-06-13 PROCEDURE — 81003 URINALYSIS AUTO W/O SCOPE: CPT

## 2021-06-13 PROCEDURE — 74011250636 HC RX REV CODE- 250/636: Performed by: EMERGENCY MEDICINE

## 2021-06-13 PROCEDURE — 99283 EMERGENCY DEPT VISIT LOW MDM: CPT

## 2021-06-13 PROCEDURE — C9113 INJ PANTOPRAZOLE SODIUM, VIA: HCPCS | Performed by: EMERGENCY MEDICINE

## 2021-06-13 RX ORDER — SODIUM CHLORIDE 9 MG/ML
75 INJECTION, SOLUTION INTRAVENOUS CONTINUOUS
Status: DISCONTINUED | OUTPATIENT
Start: 2021-06-13 | End: 2021-06-14

## 2021-06-13 RX ORDER — SODIUM CHLORIDE 0.9 % (FLUSH) 0.9 %
5-40 SYRINGE (ML) INJECTION EVERY 8 HOURS
Status: DISCONTINUED | OUTPATIENT
Start: 2021-06-13 | End: 2021-06-16 | Stop reason: HOSPADM

## 2021-06-13 RX ORDER — ONDANSETRON 2 MG/ML
4 INJECTION INTRAMUSCULAR; INTRAVENOUS
Status: COMPLETED | OUTPATIENT
Start: 2021-06-13 | End: 2021-06-13

## 2021-06-13 RX ORDER — ONDANSETRON 2 MG/ML
4 INJECTION INTRAMUSCULAR; INTRAVENOUS
Status: DISCONTINUED | OUTPATIENT
Start: 2021-06-13 | End: 2021-06-16 | Stop reason: HOSPADM

## 2021-06-13 RX ORDER — MORPHINE SULFATE 4 MG/ML
4 INJECTION INTRAVENOUS
Status: COMPLETED | OUTPATIENT
Start: 2021-06-13 | End: 2021-06-13

## 2021-06-13 RX ORDER — MORPHINE SULFATE 4 MG/ML
4 INJECTION INTRAVENOUS
Status: DISCONTINUED | OUTPATIENT
Start: 2021-06-13 | End: 2021-06-16 | Stop reason: HOSPADM

## 2021-06-13 RX ORDER — SODIUM CHLORIDE 0.9 % (FLUSH) 0.9 %
5-40 SYRINGE (ML) INJECTION AS NEEDED
Status: DISCONTINUED | OUTPATIENT
Start: 2021-06-13 | End: 2021-06-16 | Stop reason: HOSPADM

## 2021-06-13 RX ADMIN — SODIUM CHLORIDE 1000 ML: 900 INJECTION, SOLUTION INTRAVENOUS at 21:53

## 2021-06-13 RX ADMIN — ONDANSETRON 4 MG: 2 INJECTION INTRAMUSCULAR; INTRAVENOUS at 23:39

## 2021-06-13 RX ADMIN — MORPHINE SULFATE 4 MG: 4 INJECTION INTRAVENOUS at 21:55

## 2021-06-13 RX ADMIN — SODIUM CHLORIDE 75 ML/HR: 900 INJECTION, SOLUTION INTRAVENOUS at 23:13

## 2021-06-13 RX ADMIN — Medication 10 ML: at 23:17

## 2021-06-13 RX ADMIN — ONDANSETRON 4 MG: 2 INJECTION INTRAMUSCULAR; INTRAVENOUS at 21:53

## 2021-06-13 RX ADMIN — SODIUM CHLORIDE 40 MG: 9 INJECTION, SOLUTION INTRAMUSCULAR; INTRAVENOUS; SUBCUTANEOUS at 21:53

## 2021-06-13 NOTE — ED TRIAGE NOTES
The pt had been seen at [address] and admitted for vomiting and ulcers. He was discharged 6/11 and told to return if problems persisted. The pt had continued to vomit with abdominal pain since and returned today.

## 2021-06-13 NOTE — ED TRIAGE NOTES
The pt had been seen at [address] and admitted for vomiting and ulcers. He was discharged 6/11 and told to return if problems persisted. The pt had continued to vomit with abdominal pain since and returned today.

## 2021-06-14 ENCOUNTER — ANESTHESIA EVENT (OUTPATIENT)
Dept: ENDOSCOPY | Age: 36
DRG: 368 | End: 2021-06-14
Payer: COMMERCIAL

## 2021-06-14 LAB
GLUCOSE BLD STRIP.AUTO-MCNC: 107 MG/DL (ref 65–100)
SERVICE CMNT-IMP: ABNORMAL

## 2021-06-14 PROCEDURE — 82962 GLUCOSE BLOOD TEST: CPT

## 2021-06-14 PROCEDURE — 74011250636 HC RX REV CODE- 250/636: Performed by: HOSPITALIST

## 2021-06-14 PROCEDURE — 65270000029 HC RM PRIVATE

## 2021-06-14 PROCEDURE — 2709999900 HC NON-CHARGEABLE SUPPLY

## 2021-06-14 PROCEDURE — 74011000250 HC RX REV CODE- 250: Performed by: HOSPITALIST

## 2021-06-14 PROCEDURE — 74011250637 HC RX REV CODE- 250/637: Performed by: NURSE PRACTITIONER

## 2021-06-14 PROCEDURE — C9113 INJ PANTOPRAZOLE SODIUM, VIA: HCPCS | Performed by: HOSPITALIST

## 2021-06-14 RX ORDER — SODIUM CHLORIDE, SODIUM LACTATE, POTASSIUM CHLORIDE, CALCIUM CHLORIDE 600; 310; 30; 20 MG/100ML; MG/100ML; MG/100ML; MG/100ML
100 INJECTION, SOLUTION INTRAVENOUS CONTINUOUS
Status: CANCELLED | OUTPATIENT
Start: 2021-06-14

## 2021-06-14 RX ORDER — SODIUM CHLORIDE AND POTASSIUM CHLORIDE 150; 450 MG/100ML; MG/100ML
INJECTION, SOLUTION INTRAVENOUS CONTINUOUS
Status: DISPENSED | OUTPATIENT
Start: 2021-06-14 | End: 2021-06-15

## 2021-06-14 RX ORDER — SUCRALFATE 1 G/1
1 TABLET ORAL
Status: DISCONTINUED | OUTPATIENT
Start: 2021-06-14 | End: 2021-06-15

## 2021-06-14 RX ADMIN — POTASSIUM CHLORIDE AND SODIUM CHLORIDE: 450; 150 INJECTION, SOLUTION INTRAVENOUS at 17:29

## 2021-06-14 RX ADMIN — SUCRALFATE 1 G: 1 TABLET ORAL at 15:17

## 2021-06-14 RX ADMIN — SUCRALFATE 1 G: 1 TABLET ORAL at 21:15

## 2021-06-14 RX ADMIN — Medication 10 ML: at 21:15

## 2021-06-14 RX ADMIN — Medication 10 ML: at 05:54

## 2021-06-14 RX ADMIN — SODIUM CHLORIDE 40 MG: 9 INJECTION, SOLUTION INTRAMUSCULAR; INTRAVENOUS; SUBCUTANEOUS at 17:30

## 2021-06-14 RX ADMIN — Medication 10 ML: at 14:57

## 2021-06-14 RX ADMIN — ONDANSETRON 4 MG: 2 INJECTION INTRAMUSCULAR; INTRAVENOUS at 05:47

## 2021-06-14 RX ADMIN — SODIUM CHLORIDE 40 MG: 9 INJECTION, SOLUTION INTRAMUSCULAR; INTRAVENOUS; SUBCUTANEOUS at 05:45

## 2021-06-14 RX ADMIN — POTASSIUM CHLORIDE AND SODIUM CHLORIDE: 450; 150 INJECTION, SOLUTION INTRAVENOUS at 08:51

## 2021-06-14 RX ADMIN — MORPHINE SULFATE 4 MG: 4 INJECTION INTRAVENOUS at 05:47

## 2021-06-14 NOTE — PROGRESS NOTES
Dual skin assessment completed with Shellie Gandhi. No open or red areas noted.  Continue to monitor

## 2021-06-14 NOTE — H&P (VIEW-ONLY)
[facility] Consult Note Primary GI Physician: New [patient-Dr Thomas Mins Referring Provider:  Dr Tyrone Thomas Consult Date:  6/14/2021 Admit Date:  6/13/2021 Chief Complaint:  Nausea, vomiting, Hematemsis Subjective:  
 
History of Present Illness:  Patient is a 38 y.o. male with PMH including but not limited to recent gastroenteritis and GERD, who is seen in consultation at the request of Dr. Tyrone Thomas for nausea, vomiting, hematemesis. He had been discharged 6/11, see details below. He reported continued nausea and vomiting despite home medications. He reports several of the episodes last night in ED had streaks of BRB and small clots. Also complains of epigastric pain. Labs in ED with hypokalemia. HGB 16.2. No labs checked today. Was admitted to hospitalist. He was started on IV pantoprazole. On clear liquids. History of GERD with intermittent epigastric pain for several years. Still had some nausea this am, no further vomiting. No current nausea. He does have some epigastric pain. He has not had any BM in 2 days. Last BM did look dark, but pt had taken Pepto Bismol that day. No frequent NSAID use. No recent etoh use. Does smoke cigarettes (about 3 daily). Denies any current marijuana use. No prior EGD or colonoscopy. No known family hx of colon polyps or colon cancer. Recent hospitalization 6/10/21-6/11/21 for gastroenteritis. Presented with 1 day of n/v/diarrhea after eating chicken at a local [employer]. Reported social drinking and marijuana use. At that time, last use was day of admission. He was given Zofran and promethazine without improvement of nausea/vomiting. CT with findings suggestive of mild colitis. He was started on Cipro and Flagyl. He was also given pantoprazole and Carafate. Improved and diet advanced. He was discharged on antibiotics. PMH: 
Past Medical History:  
Diagnosis Date  Gastroenteritis 6/10/2021 PSH: 
No past surgical history on file. 
 
Allergies: 
No Known Allergies Home Medications: 
Prior to Admission medications Medication Sig Start Date End Date Taking? Authorizing Provider  
ciprofloxacin HCl (CIPRO) 500 mg tablet Take 1 Tablet by mouth two (2) times a day for 4 days. 6/11/21 6/15/21 Yes Salinas Beasley MD  
metroNIDAZOLE (FLAGYL) 500 mg tablet Take 1 Tablet by mouth three (3) times daily for 4 days. 6/11/21 6/15/21 Yes Salinas Beasley MD  
pantoprazole (PROTONIX) 40 mg tablet Take 1 Tablet by mouth two (2) times a day for 14 days. 6/11/21 6/25/21 Yes Salinas Beasley MD  
sucralfate (CARAFATE) 1 gram tablet Take 1 Tablet by mouth Before breakfast, lunch, dinner and at bedtime for 7 days. 6/11/21 6/18/21 Yes Salinas Beasley MD  
ondansetron (ZOFRAN ODT) 4 mg disintegrating tablet Take 1 Tablet by mouth every eight (8) hours as needed for Nausea or Vomiting for up to 5 days. 6/11/21 6/16/21 Yes Salinas Beasley MD  
 
 
Hospital Medications: 
Current Facility-Administered Medications Medication Dose Route Frequency  0.45% sodium chloride with KCl 20 mEq/L infusion   IntraVENous CONTINUOUS  
 sodium chloride (NS) flush 5-40 mL  5-40 mL IntraVENous Q8H  
 sodium chloride (NS) flush 5-40 mL  5-40 mL IntraVENous PRN  
 ondansetron (ZOFRAN) injection 4 mg  4 mg IntraVENous Q6H PRN  pantoprazole (PROTONIX) 40 mg in 0.9% sodium chloride 10 mL injection  40 mg IntraVENous Q12H  
 morphine injection 4 mg  4 mg IntraVENous Q3H PRN Social History: 
Social History Tobacco Use  Smoking status: Never Smoker Substance Use Topics  Alcohol use: No  
 
 
Pt denies any history of drug use, blood transfusions, or tattoos. Family History: 
History reviewed. No pertinent family history. Review of Systems: A detailed 10 system ROS is obtained, with pertinent positives as listed above. All others are negative. Diet:   
 
Objective:  
 
Physical Exam: 
Vitals: 
Visit Vitals BP (!) 131/92 (BP 1 Location: Left upper arm, BP Patient Position: At rest) Pulse 77 Temp 98.7 °F (37.1 °C) Resp 16 Ht 6' 1\" (1.854 m) Wt 117.9 kg (260 lb) SpO2 100% BMI 34.30 kg/m² Gen:  Pt is alert, cooperative, no acute distress Skin:  Extremities and face reveal no rashes. HEENT: Sclerae anicteric. Extra-occular muscles are intact. No oral ulcers. No abnormal pigmentation of the lips. The neck is supple. Cardiovascular: Regular rate and rhythm. No murmurs, gallops, or rubs. Respiratory:  Comfortable breathing with no accessory muscle use. Clear breath sounds anteriorly with no wheezes, rales, or rhonchi. GI:  Abdomen nondistended, soft, and nontender. Normal active bowel sounds. No enlargement of the liver or spleen. No masses palpable. Rectal:  Deferred Musculoskeletal:  No pitting edema of the lower legs. Neurological:  Gross memory appears intact. Patient is alert and oriented. Psychiatric:  Mood appears appropriate with judgement intact. Lymphatic:  No cervical or supraclavicular adenopathy. Laboratory:   
Recent Labs  
  06/13/21 1916 WBC 20.2* HGB 16.2 HCT 47.5  MCV 86.2   
K 3.2*  
 CO2 22 BUN 13  
CREA 1.17 CA 8.9 * AP 58 AST 24 ALT 57 TBILI 0.7 ALB 4.1 TP 7.9 LPSE 379 CT abdomen and pelvis 
  
DATE: 6/10/2021 
  
CLINICAL INFORMATION: Abdominal pain 
  
Spiral images of the abdomen and pelvis were obtained using 1 cc Isovue 370 
intravenous contrast. 
  
All CT scanners at this facility use one or all of the following:  automated 
exposure control, adjustment of the mA and or kVp according to patient size, 
iterative reconstruction 
  
CT ABDOMEN: 
Upper images show calcified lymph node inferior left hilum. 
  
Spleen is normal. Liver is normal. No calcified gallstones. Normal pancreas. Normal adrenals. Normal kidneys. No hydronephrosis.  Abdominal aorta is normal in 
size. 
  
CT PELVIS: 
No mass, adenopathy or abnormal fluid collection. 
  
No bowel obstruction. There does appear to be wall thickening in the ascending 
colon with no infiltration in the surrounding fat. This is nonspecific. 
  
  
  
IMPRESSION 1. Mild wall thickening in the ascending colon. Assessment:  
 
Active Problems: 
  GI bleed (6/13/2021) Patient is a 38 y.o. male with PMH including but not limited to recent gastroenteritis and GERD, who is seen in consultation at the request of Dr. Sasha Bradford for nausea, vomiting, hematemesis. Recent hospitalization for gastroenteritis. Continued nausea and vomiting at home. Complained of epigastric pain as well. He reports seeing streaks of BRB and small clots in emesis while in ED last night. No further vomiting. Still has epigastric pain. No lower GI symptoms. Labs for today ordered and pending collection. Recommend EGD for further evaluation. Procedure and risks explained to patient, who voices understanding and agrees to proceed. Plan:  
 
-Monitor labs (ordered for today and pending collection) -Supportive care, antiemetics, etc as per primary team 
-Continue pantoprazole 40mg IV BID 
-Will add Carafate 1g QID 
-EGD tomorrow for further evaluation 
-Further recommendations pending procedure results JUAN Johnson Patient is seen and examined in collaboration with Dr. Rashi Redmond. Assessment and plan as per Dr. Rashi Redmond. GI--addendum--patient seen and examined. Agree with above (? MW tear, Helicobacter gastritis, PUD etc. Are all  Possible). Labs noted. The patient is feeling better this evening. Will proceed with an EGD tomorrow as well as repeat labs.   Thanks Abdullahi Flores MD

## 2021-06-14 NOTE — H&P
[facility] History and Physical       Name:  Jammie Blackburn  Age:35 y.o. Sex:male   :  1985    MRN:  [id number]   PCP:  None      Admit Date:  2021  8:03 PM   Chief Complaint: Nausea, vomiting, abdominal pain, hematemesis    Reason for Admission:   GI bleed [K92.2]    Assessment & Plan:         Intractable nausea vomiting with hematemesis/GI bleed: Patient looks dehydrated, hemoglobin in fact much higher than before likely secondary to dehydration, patient will be admitted to the hospital for close monitoring, will obtain GI consultation, initiated on Protonix IV. Will provide antiemetic therapy. Patient will benefit from EGD. Poorly controlled hypertension: Likely secondary to distress caused by abdominal discomfort. History of mild colitis: Completed 4-day course of p.o. antibiotics, as patient unable to tolerate p.o. antibiotics with Zosyn which should be able to cover gram nutrition anaerobes. Disposition/Expected LOS: 3  Diet: DIET ADULT  VTE ppx: scd  GI ppx: protonix  Code status: Full Code  Surrogate decision-maker: self      History of Presenting Illness:     38years old male with past medical history of GERD presented, history of recent mild colitis was treated with p.o. antibiotics, admitted recently and discharged home on 6/10 presented to emergency room with chief complaint of continuation of nausea and vomiting despite of taking his medications. Patient reports several episodes of nausea, vomiting, and some of the episodes patient had blood in his vomitus with some clots. History of epigastric abdominal discomfort. Patient was evaluated in ER, initial lab work shows evidence of hypokalemia, emergency room physician requested admission of this patient due to no improvement in his symptoms despite of giving antiemetics. Patient reports history of epigastric abdominal pain. Denies any melanotic stools.   Denies any chest pain, shortness of breath. Review of Systems:  A 14 point review of systems was taken and pertinent positive as per HPI. Past Medical History:   Diagnosis Date    Gastroenteritis 6/10/2021     Past surgical history none, social history:     Never cigarette smoker but used to use marijuana, no history of alcohol abuse. History of hypertension and some family. No past surgical history on file. Family History : reviewed  History reviewed. No pertinent family history. Social History     Tobacco Use    Smoking status: Never Smoker   Substance Use Topics    Alcohol use: No       No Known Allergies      There is no immunization history on file for this patient. PTA Medications:  Current Outpatient Medications   Medication Instructions    ciprofloxacin HCl (CIPRO) 500 mg, Oral, 2 TIMES DAILY    metroNIDAZOLE (FLAGYL) 500 mg, Oral, 3 TIMES DAILY    ondansetron (ZOFRAN ODT) 4 mg, Oral, EVERY 8 HOURS AS NEEDED    pantoprazole (PROTONIX) 40 mg, Oral, 2 TIMES DAILY    sucralfate (CARAFATE) 1 g, Oral, 4 TIMES DAILY BEFORE MEALS & NIGHTLY       Objective:     Patient Vitals for the past 24 hrs:   Temp Pulse Resp BP SpO2   06/13/21 2154 -- 62 18 (!) 168/97 100 %   06/13/21 1856 98.2 °F (36.8 °C) 80 18 (!) 157/101 99 %       Oxygen Therapy  O2 Sat (%): 100 % (06/13/21 2154)  O2 Device: None (Room air) (06/13/21 2007)    Body mass index is 34.3 kg/m². Physical Exam:    General:  No acute distress, speaking in full sentences  HEENT:  Pupils equal and reactive to light and accommodation, oropharynx is clear   Neck:   Supple, no lymphadenopathy, no JVD   Lungs:  Clear to auscultation bilaterally   CV:   Regular rate and rhythm with normal S1 and S2   Abdomen:  Soft, tender over epigastric area, nondistended, normoactive bowel sounds   Extremities:  No cyanosis clubbing or edema   Neuro:  Nonfocal, A&O x3   Psych:  Normal mood and affect       Data Reviewed: I have reviewed all labs, meds, and studies.       Recent Results (from the past 24 hour(s))   METABOLIC PANEL, COMPREHENSIVE    Collection Time: 06/13/21  7:16 PM   Result Value Ref Range    Sodium 136 136 - 145 mmol/L    Potassium 3.2 (L) 3.5 - 5.1 mmol/L    Chloride 103 98 - 107 mmol/L    CO2 22 21 - 32 mmol/L    Anion gap 11 7 - 16 mmol/L    Glucose 144 (H) 65 - 100 mg/dL    BUN 13 6 - 23 MG/DL    Creatinine 1.17 0.8 - 1.5 MG/DL    GFR est AA >60 >60 ml/min/1.73m2    GFR est non-AA >60 >60 ml/min/1.73m2    Calcium 8.9 8.3 - 10.4 MG/DL    Bilirubin, total 0.7 0.2 - 1.1 MG/DL    ALT (SGPT) 57 12 - 65 U/L    AST (SGOT) 24 15 - 37 U/L    Alk. phosphatase 58 50 - 136 U/L    Protein, total 7.9 6.3 - 8.2 g/dL    Albumin 4.1 3.5 - 5.0 g/dL    Globulin 3.8 (H) 2.3 - 3.5 g/dL    A-G Ratio 1.1 (L) 1.2 - 3.5     CBC WITH AUTOMATED DIFF    Collection Time: 06/13/21  7:16 PM   Result Value Ref Range    WBC 20.2 (H) 4.3 - 11.1 K/uL    RBC 5.51 4.23 - 5.6 M/uL    HGB 16.2 13.6 - 17.2 g/dL    HCT 47.5 41.1 - 50.3 %    MCV 86.2 79.6 - 97.8 FL    MCH 29.4 26.1 - 32.9 PG    MCHC 34.1 31.4 - 35.0 g/dL    RDW 12.9 11.9 - 14.6 %    PLATELET 831 024 - 516 K/uL    MPV 9.7 9.4 - 12.3 FL    ABSOLUTE NRBC 0.00 0.0 - 0.2 K/uL    DF AUTOMATED      NEUTROPHILS 77 43 - 78 %    LYMPHOCYTES 13 13 - 44 %    MONOCYTES 8 4.0 - 12.0 %    EOSINOPHILS 0 (L) 0.5 - 7.8 %    BASOPHILS 1 0.0 - 2.0 %    IMMATURE GRANULOCYTES 1 0.0 - 5.0 %    ABS. NEUTROPHILS 15.6 (H) 1.7 - 8.2 K/UL    ABS. LYMPHOCYTES 2.6 0.5 - 4.6 K/UL    ABS. MONOCYTES 1.7 (H) 0.1 - 1.3 K/UL    ABS. EOSINOPHILS 0.1 0.0 - 0.8 K/UL    ABS. BASOPHILS 0.1 0.0 - 0.2 K/UL    ABS. IMM.  GRANS. 0.2 0.0 - 0.5 K/UL   LIPASE    Collection Time: 06/13/21  7:16 PM   Result Value Ref Range    Lipase 379 73 - 393 U/L   URINE MICROSCOPIC    Collection Time: 06/13/21  8:28 PM   Result Value Ref Range    WBC 3-5 0 /hpf    RBC 0 0 /hpf    Epithelial cells 0-3 0 /hpf    Bacteria 0 0 /hpf    Casts 0 0 /lpf    Crystals, urine 0 0 /LPF    Mucus 2+ (H) 0 /lpf       EKG Results     None          All Micro Results     None          Other Studies:  No results found.       Medications:  Medications Administered      Medications Administered     morphine injection 4 mg     Admin Date  06/13/2021 Action  Given Dose  4 mg Route  IntraVENous Administered By  Maru Saab          ondansetron [facility]) injection 4 mg     Admin Date  06/13/2021 Action  Given Dose  4 mg Route  IntraVENous Administered By  Maru Saab          pantoprazole (PROTONIX) 40 mg in 0.9% sodium chloride 10 mL injection     Admin Date  06/13/2021 Action  Given Dose  40 mg Route  IntraVENous Administered By  Maru Saab          sodium chloride 0.9 % bolus infusion 1,000 mL     Admin Date  06/13/2021 Action  New Bag Dose  1,000 mL Rate  500 mL/hr Route  IntraVENous Administered By  Maru Saab                      Problem List:     Hospital Problems as of 6/13/2021 Never Reviewed        Codes Class Noted - Resolved POA    GI bleed ICD-10-CM: K92.2  ICD-9-CM: 578.9  6/13/2021 - Present Unknown                 Signed By: Gia Cho MD   [facility] Hospitalist Service    June 13, 2021  4:22 PM

## 2021-06-14 NOTE — PROGRESS NOTES
[facility] Service Progress Note    INTERVAL HISTORY  / Subjective:  No longer nauseous, does not have resting abdominal pain. Abdominal pain is provoked by deep palpation only. Assessment / Plan:  38years old male with past medical history of GERD presented, history of recent mild colitis was treated with p.o. antibiotics, admitted recently and discharged home on 6/10 presented to emergency room with chief complaint of continuation of nausea and vomiting despite of taking his medications. Patient reports several episodes of nausea, vomiting, and some of the episodes patient had blood in his vomitus with some clots. History of epigastric abdominal discomfort. Patient was evaluated in ER, initial lab work shows evidence of hypokalemia, hemoconcentration on labs.      Abdominal pain, intractable nausea and vomiting, hematemesis/acute gastrointestinal hemorrhaging  -Risk factors for peptic ulcer disease is tobacco use, denies NSAID or aspirin use, denies cannabis use, his father was an alcoholic so he abstains from alcohol entirely  -He did have blood streak in his vomit prior to admission  -Gastroenterology consulted and recommends EGD which was planned to be performed tomorrow 06/15   --CT scan from June 11 showed thickening of the ascending colon    Electrolyte abnormalities, dehydration, and hemoconcentration  --He explains he had exposure to elevated ambient temperatures, his apartment had him had been without air conditioning and temperatures as high as 9 degrees in house were recorded, plus prior to his previous discharge June 11 he was prescribed metronidazole which was causing him nauseous  -Hypokalemia, elevated creatinine and BUN from last check on June 11  --06/14 dark urine now becoming lighter , IV fluids with one half NS plus potassium additive at 150 cc an hour    Reactive SIRS, noninfectious SIRS  -Hemoconcentration secondary to dehydration, again he explains he had exposure to elevated ambient temperatures, his apartment had him had been without air conditioning and temperatures as high as 9 degrees in house were recorded, plus prior to his previous discharge June 11 he was prescribed metronidazole which was causing him nauseous          Objective:  Visit Vitals  BP (!) 152/103 (BP 1 Location: Left upper arm, BP Patient Position: Sitting) Comment: MD aware; he is current in the room discussing patient care   Pulse 75   Temp 99.4 °F (37.4 °C)   Resp 18   Ht 6' 1\" (1.854 m)   Wt 117.9 kg (260 lb)   SpO2 100%   BMI 34.30 kg/m²                 Physical Exam:  General: No acute distress, speaking in full sentences, no use of accessory muscles   HEENT: Pupils equal and reactive to light and accommodation, he exhibits dry oral mucosa  Neck: Supple, no lymphadenopathy, no JVD   Lungs: Clear to auscultation bilaterally   Cardiovascular: Regular rate and rhythm with normal S1 and S2   Abdomen: Soft, nontender, nondistended, normoactive bowel sounds   Extremities: No cyanosis clubbing or edema   Neuro: Nonfocal, A&O x3   Psych: Normal affect     Intake and Output:  Date 06/13/21 0700 - 06/14/21 0659 06/14/21 0700 - 06/15/21 0659   Shift 1500-1051 2855-8723 24 Hour Total 0765-9945 9453-4996 24 Hour Total   INTAKE   P.O.  500 500 240  240     P. O.  500 500 240  240   I. V.(mL/kg/hr)  473(0.3) 473(0.2)        I.V.  473 473      Shift Total(mL/kg)  973(8.3) 973(8.3) 240(2)  240(2)   OUTPUT   Urine(mL/kg/hr)  800(0.6) 800(0.3)        Urine Voided  800 800        Urine Occurrence(s)    2 x  2 x   Shift Total(mL/kg)  800(6.8) 800(6.8)      NET  173 173 240  240   Weight (kg) 117.9 117.9 117.9 117.9 117.9 117.9       LAB:  Admission on 06/13/2021   Component Date Value    Sodium 06/13/2021 136     Potassium 06/13/2021 3.2*    Chloride 06/13/2021 103     CO2 06/13/2021 22     Anion gap 06/13/2021 11     Glucose 06/13/2021 144*    BUN 06/13/2021 13     Creatinine 06/13/2021 1.17     GFR est AA 06/13/2021 >60  GFR est non-AA 06/13/2021 >60     Calcium 06/13/2021 8.9     Bilirubin, total 06/13/2021 0.7     ALT (SGPT) 06/13/2021 57     AST (SGOT) 06/13/2021 24     Alk. phosphatase 06/13/2021 58     Protein, total 06/13/2021 7.9     Albumin 06/13/2021 4.1     Globulin 06/13/2021 3.8*    A-G Ratio 06/13/2021 1.1*    WBC 06/13/2021 20.2*    RBC 06/13/2021 5.51     HGB 06/13/2021 16.2     HCT 06/13/2021 47.5     MCV 06/13/2021 86.2     MCH 06/13/2021 29.4     MCHC 06/13/2021 34.1     RDW 06/13/2021 12.9     PLATELET 47/20/9310 134     MPV 06/13/2021 9.7     ABSOLUTE NRBC 06/13/2021 0.00     DF 06/13/2021 AUTOMATED     NEUTROPHILS 06/13/2021 77     LYMPHOCYTES 06/13/2021 13     MONOCYTES 06/13/2021 8     EOSINOPHILS 06/13/2021 0*    BASOPHILS 06/13/2021 1     IMMATURE GRANULOCYTES 06/13/2021 1     ABS. NEUTROPHILS 06/13/2021 15.6*    ABS. LYMPHOCYTES 06/13/2021 2.6     ABS. MONOCYTES 06/13/2021 1.7*    ABS. EOSINOPHILS 06/13/2021 0.1     ABS. BASOPHILS 06/13/2021 0.1     ABS. IMM. GRANS. 06/13/2021 0.2     Lipase 06/13/2021 379     WBC 06/13/2021 3-5     RBC 06/13/2021 0     Epithelial cells 06/13/2021 0-3     Bacteria 06/13/2021 0     Casts 06/13/2021 0     Crystals, urine 06/13/2021 0     Mucus 06/13/2021 2+*    Glucose (POC) 06/14/2021 107*    Performed by 06/14/2021 [email or web address]        IMAGING:  CT ABD PELV W CONT    Result Date: 6/10/2021  1. Mild wall thickening in the ascending colon. EKG:  No results found for this or any previous visit.           Ayesha Demarco MD  6/14/2021 4:25 PM

## 2021-06-14 NOTE — PROGRESS NOTES
[facility] Progress Note    INTERVAL HISTORY  / Subjective:  No longer nauseous, does not have resting abdominal pain. Abdominal pain is provoked by deep palpation only. Assessment / Plan:  38years old male with past medical history of GERD presented, history of recent mild colitis was treated with p.o. antibiotics, admitted recently and discharged home on 6/10 presented to emergency room with chief complaint of continuation of nausea and vomiting despite of taking his medications. Patient reports several episodes of nausea, vomiting, and some of the episodes patient had blood in his vomitus with some clots. History of epigastric abdominal discomfort. Patient was evaluated in ER, initial lab work shows evidence of hypokalemia, hemoconcentration on labs.      Abdominal pain, intractable nausea and vomiting, hematemesis/acute gastrointestinal hemorrhaging  Risk factors for peptic ulcer disease is tobacco use, denies NSAID or aspirin use, denies cannabis use, his father was an alcoholic so he abstains from alcohol entirely  He did have blood streak in his vomit prior to admission  Gastroenterology consulted and recommends EGD which was planned to be performed tomorrow 06/15   --CT scan from June 11 showed thickening of the ascending colon    Electrolyte abnormalities, dehydration, and hemoconcentration  --He explains he had exposure to elevated ambient temperatures, his apartment had him had been without air conditioning and temperatures as high as 9 degrees in house were recorded, plus prior to his previous discharge June 11 he was prescribed metronidazole which was causing him nauseous  Hypokalemia, elevated creatinine and BUN from last check on June 11  --06/14 dark urine now becoming lighter , IV fluids with one half NS plus potassium additive at 150 cc an hour    Reactive SIRS, noninfectious SIRS  Hemoconcentration secondary to dehydration, again he explains he had exposure to elevated ambient temperatures, his apartment had him had been without air conditioning and temperatures as high as 9 degrees in house were recorded, plus prior to his previous discharge June 11 he was prescribed metronidazole which was causing him nauseous          Objective:  Visit Vitals  BP (!) 152/103 (BP 1 Location: Left upper arm, BP Patient Position: Sitting) Comment: MD aware; he is current in the room discussing patient care   Pulse 75   Temp 99.4 °F (37.4 °C)   Resp 18   Ht 6' 1\" (1.854 m)   Wt 117.9 kg (260 lb)   SpO2 100%   BMI 34.30 kg/m²                 Physical Exam:  General: No acute distress, speaking in full sentences, no use of accessory muscles   HEENT: Pupils equal and reactive to light and accommodation, he exhibits dry oral mucosa  Neck: Supple, no lymphadenopathy, no JVD   Lungs: Clear to auscultation bilaterally   Cardiovascular: Regular rate and rhythm with normal S1 and S2   Abdomen: Soft, nontender, nondistended, normoactive bowel sounds   Extremities: No cyanosis clubbing or edema   Neuro: Nonfocal, A&O x3   Psych: Normal affect     Intake and Output:  Date 06/13/21 0700 - 06/14/21 0659 06/14/21 0700 - 06/15/21 0659   Shift 5874-5138 1087-3760 24 Hour Total 9047-6569 0258-2025 24 Hour Total   INTAKE   P.O.  500 500 240  240     P. O.  500 500 240  240   I. V.(mL/kg/hr)  473(0.3) 473(0.2)        I.V.  473 473      Shift Total(mL/kg)  973(8.3) 973(8.3) 240(2)  240(2)   OUTPUT   Urine(mL/kg/hr)  800(0.6) 800(0.3)        Urine Voided  800 800        Urine Occurrence(s)    2 x  2 x   Shift Total(mL/kg)  800(6.8) 800(6.8)      NET  173 173 240  240   Weight (kg) 117.9 117.9 117.9 117.9 117.9 117.9       LAB:  Admission on 06/13/2021   Component Date Value    Sodium 06/13/2021 136     Potassium 06/13/2021 3.2*    Chloride 06/13/2021 103     CO2 06/13/2021 22     Anion gap 06/13/2021 11     Glucose 06/13/2021 144*    BUN 06/13/2021 13     Creatinine 06/13/2021 1.17     GFR est AA 06/13/2021 >60  GFR est non-AA 06/13/2021 >60     Calcium 06/13/2021 8.9     Bilirubin, total 06/13/2021 0.7     ALT (SGPT) 06/13/2021 57     AST (SGOT) 06/13/2021 24     Alk. phosphatase 06/13/2021 58     Protein, total 06/13/2021 7.9     Albumin 06/13/2021 4.1     Globulin 06/13/2021 3.8*    A-G Ratio 06/13/2021 1.1*    WBC 06/13/2021 20.2*    RBC 06/13/2021 5.51     HGB 06/13/2021 16.2     HCT 06/13/2021 47.5     MCV 06/13/2021 86.2     MCH 06/13/2021 29.4     MCHC 06/13/2021 34.1     RDW 06/13/2021 12.9     PLATELET 15/64/3713 645     MPV 06/13/2021 9.7     ABSOLUTE NRBC 06/13/2021 0.00     DF 06/13/2021 AUTOMATED     NEUTROPHILS 06/13/2021 77     LYMPHOCYTES 06/13/2021 13     MONOCYTES 06/13/2021 8     EOSINOPHILS 06/13/2021 0*    BASOPHILS 06/13/2021 1     IMMATURE GRANULOCYTES 06/13/2021 1     ABS. NEUTROPHILS 06/13/2021 15.6*    ABS. LYMPHOCYTES 06/13/2021 2.6     ABS. MONOCYTES 06/13/2021 1.7*    ABS. EOSINOPHILS 06/13/2021 0.1     ABS. BASOPHILS 06/13/2021 0.1     ABS. IMM. GRANS. 06/13/2021 0.2     Lipase 06/13/2021 379     WBC 06/13/2021 3-5     RBC 06/13/2021 0     Epithelial cells 06/13/2021 0-3     Bacteria 06/13/2021 0     Casts 06/13/2021 0     Crystals, urine 06/13/2021 0     Mucus 06/13/2021 2+*    Glucose (POC) 06/14/2021 107*    Performed by 06/14/2021 [facility]        IMAGING:  CT ABD PELV W CONT    Result Date: 6/10/2021  1. Mild wall thickening in the ascending colon. EKG:  No results found for this or any previous visit.           Gisel Leghorn, MD  6/14/2021 4:25 PM

## 2021-06-14 NOTE — ED PROVIDER NOTES
Here with abdominal pain with nausea and vomiting. Just discharged from hospital. States some streaks of blood in emesis. No fever. States has abdominal pain. Some marijuana use. The history is provided by the patient and a friend. Abdominal Pain   This is a new problem. The problem occurs constantly. The pain is located in the generalized abdominal region. Associated symptoms include a fever, diarrhea, melena, nausea, vomiting and frequency. Pertinent negatives include no hematochezia and no dysuria. The pain is worsened by eating. His past medical history is significant for PUD, irritable bowel syndrome and pancreatitis. Past Medical History:   Diagnosis Date    Gastroenteritis 6/10/2021       No past surgical history on file. History reviewed. No pertinent family history. Social History     Socioeconomic History    Marital status: SINGLE     Spouse name: Not on file    Number of children: Not on file    Years of education: Not on file    Highest education level: Not on file   Occupational History    Not on file   Tobacco Use    Smoking status: Never Smoker   Substance and Sexual Activity    Alcohol use: No    Drug use: No    Sexual activity: Not on file   Other Topics Concern    Not on file   Social History Narrative    Not on file     Social Determinants of Health     Financial Resource Strain:     Difficulty of Paying Living Expenses:    Food Insecurity:     Worried About Running Out of Food in the Last Year:     [address] in the Last Year:    Transportation Needs:     Lack of Transportation (Medical):      Lack of Transportation (Non-Medical):    Physical Activity:     Days of Exercise per Week:     Minutes of Exercise per Session:    Stress:     Feeling of Stress :    Social Connections:     Frequency of Communication with Friends and Family:     Frequency of Social Gatherings with Friends and Family:     Attends Hoahaoism Services:     Active Member of [employer] or Organizations:     Attends Club or Organization Meetings:     Marital Status:    Intimate Partner Violence:     Fear of Current or Ex-Partner:     Emotionally Abused:     Physically Abused:     Sexually Abused: ALLERGIES: Patient has no known allergies. Review of Systems   Constitutional: Positive for fever. Negative for chills and diaphoresis. Respiratory: Negative. Gastrointestinal: Positive for abdominal pain, diarrhea, melena, nausea and vomiting. Negative for hematochezia. Genitourinary: Positive for frequency. Negative for dysuria. Musculoskeletal: Negative. Psychiatric/Behavioral: Negative for confusion and decreased concentration. All other systems reviewed and are negative. Vitals:    06/13/21 1856   BP: (!) 157/101   Pulse: 80   Resp: 18   Temp: 98.2 °F (36.8 °C)   SpO2: 99%   Weight: 117.9 kg (260 lb)   Height: 6' 1\" (1.854 m)            Physical Exam  Vitals and nursing note reviewed. Constitutional:       General: He is not in acute distress. Appearance: He is well-developed. HENT:      Head: Atraumatic. Eyes:      General: No scleral icterus. Cardiovascular:      Rate and Rhythm: Normal rate and regular rhythm. Pulmonary:      Effort: Pulmonary effort is normal. No respiratory distress. Abdominal:      General: Abdomen is flat. Bowel sounds are normal.      Palpations: Abdomen is soft. Tenderness: There is generalized abdominal tenderness. There is no right CVA tenderness, left CVA tenderness, guarding or rebound. Hernia: No hernia is present. Musculoskeletal:      Cervical back: Neck supple. Skin:     General: Skin is warm and dry. Neurological:      Mental Status: He is alert. He is disoriented. Psychiatric:         Mood and Affect: Mood normal.         Behavior: Behavior normal.         Thought Content:  Thought content normal.          MDM  Number of Diagnoses or Management Options  Diagnosis management comments: Appears uncomfortable but not unstable. Continues to vomit and states compliant with meds. No other changes.        Amount and/or Complexity of Data Reviewed  Clinical lab tests: ordered and reviewed    Risk of Complications, Morbidity, and/or Mortality  Presenting problems: moderate  Diagnostic procedures: moderate  Management options: moderate           Procedures

## 2021-06-14 NOTE — ED NOTES
TRANSFER - OUT REPORT:    Verbal report given to Jennifer Byrnes on [facility]  being transferred to [facility] [phone] for routine progression of care       Report consisted of patients Situation, Background, Assessment and   Recommendations(SBAR). Information from the following report(s) SBAR, Kardex, ED Summary, Procedure Summary and Recent Results was reviewed with the receiving nurse. Lines:   Peripheral IV 06/13/21 Right Antecubital (Active)   Site Assessment Clean, dry, & intact 06/13/21 1919   Phlebitis Assessment 0 06/13/21 1919   Infiltration Assessment 0 06/13/21 1919   Dressing Status Clean, dry, & intact 06/13/21 1919   Dressing Type Transparent 06/13/21 1919   Hub Color/Line Status Pink 06/13/21 1919   Alcohol Cap Used No 06/13/21 1919        Opportunity for questions and clarification was provided.       Patient transported with:   [employer]

## 2021-06-14 NOTE — H&P
[facility]ist History and Physical       Name:  Katie Arceo  Age:35 y.o. Sex:male   :  1985    MRN:  [id number]   PCP:  None      Admit Date:  2021  8:03 PM   Chief Complaint: Nausea, vomiting, abdominal pain, hematemesis    Reason for Admission:   GI bleed [K92.2]    Assessment & Plan:         Intractable nausea vomiting with hematemesis/GI bleed: Patient looks dehydrated, hemoglobin in fact much higher than before likely secondary to dehydration, patient will be admitted to the hospital for close monitoring, will obtain GI consultation, initiated on Protonix IV. Will provide antiemetic therapy. Patient will benefit from EGD. Poorly controlled hypertension: Likely secondary to distress caused by abdominal discomfort. History of mild colitis: Completed 4-day course of p.o. antibiotics, as patient unable to tolerate p.o. antibiotics with Zosyn which should be able to cover gram nutrition anaerobes. Disposition/Expected LOS: 3  Diet: DIET ADULT  VTE ppx: scd  GI ppx: protonix  Code status: Full Code  Surrogate decision-maker: self      History of Presenting Illness:     38years old male with past medical history of GERD presented, history of recent mild colitis was treated with p.o. antibiotics, admitted recently and discharged home on 6/10 presented to emergency room with chief complaint of continuation of nausea and vomiting despite of taking his medications. Patient reports several episodes of nausea, vomiting, and some of the episodes patient had blood in his vomitus with some clots. History of epigastric abdominal discomfort. Patient was evaluated in ER, initial lab work shows evidence of hypokalemia, emergency room physician requested admission of this patient due to no improvement in his symptoms despite of giving antiemetics. Patient reports history of epigastric abdominal pain. Denies any melanotic stools.   Denies any chest pain, shortness of breath. Review of Systems:  A 14 point review of systems was taken and pertinent positive as per HPI. Past Medical History:   Diagnosis Date    Gastroenteritis 6/10/2021     Past surgical history none, social history:     Never cigarette smoker but used to use marijuana, no history of alcohol abuse. History of hypertension and some family. No past surgical history on file. Family History : reviewed  History reviewed. No pertinent family history. Social History     Tobacco Use    Smoking status: Never Smoker   Substance Use Topics    Alcohol use: No       No Known Allergies      There is no immunization history on file for this patient. PTA Medications:  Current Outpatient Medications   Medication Instructions    ciprofloxacin HCl (CIPRO) 500 mg, Oral, 2 TIMES DAILY    metroNIDAZOLE (FLAGYL) 500 mg, Oral, 3 TIMES DAILY    ondansetron (ZOFRAN ODT) 4 mg, Oral, EVERY 8 HOURS AS NEEDED    pantoprazole (PROTONIX) 40 mg, Oral, 2 TIMES DAILY    sucralfate (CARAFATE) 1 g, Oral, 4 TIMES DAILY BEFORE MEALS & NIGHTLY       Objective:     Patient Vitals for the past 24 hrs:   Temp Pulse Resp BP SpO2   06/13/21 2154  62 18 (!) 168/97 100 %   06/13/21 1856 98.2 °F (36.8 °C) 80 18 (!) 157/101 99 %       Oxygen Therapy  O2 Sat (%): 100 % (06/13/21 2154)  O2 Device: None (Room air) (06/13/21 2007)    Body mass index is 34.3 kg/m². Physical Exam:    General:  No acute distress, speaking in full sentences  HEENT:  Pupils equal and reactive to light and accommodation, oropharynx is clear   Neck:   Supple, no lymphadenopathy, no JVD   Lungs:  Clear to auscultation bilaterally   CV:   Regular rate and rhythm with normal S1 and S2   Abdomen:  Soft, tender over epigastric area, nondistended, normoactive bowel sounds   Extremities:  No cyanosis clubbing or edema   Neuro:  Nonfocal, A&O x3   Psych:  Normal mood and affect       Data Reviewed: I have reviewed all labs, meds, and studies.       Recent Results (from the past 24 hour(s))   METABOLIC PANEL, COMPREHENSIVE    Collection Time: 06/13/21  7:16 PM   Result Value Ref Range    Sodium 136 136 - 145 mmol/L    Potassium 3.2 (L) 3.5 - 5.1 mmol/L    Chloride 103 98 - 107 mmol/L    CO2 22 21 - 32 mmol/L    Anion gap 11 7 - 16 mmol/L    Glucose 144 (H) 65 - 100 mg/dL    BUN 13 6 - 23 MG/DL    Creatinine 1.17 0.8 - 1.5 MG/DL    GFR est AA >60 >60 ml/min/1.73m2    GFR est non-AA >60 >60 ml/min/1.73m2    Calcium 8.9 8.3 - 10.4 MG/DL    Bilirubin, total 0.7 0.2 - 1.1 MG/DL    ALT (SGPT) 57 12 - 65 U/L    AST (SGOT) 24 15 - 37 U/L    Alk. phosphatase 58 50 - 136 U/L    Protein, total 7.9 6.3 - 8.2 g/dL    Albumin 4.1 3.5 - 5.0 g/dL    Globulin 3.8 (H) 2.3 - 3.5 g/dL    A-G Ratio 1.1 (L) 1.2 - 3.5     CBC WITH AUTOMATED DIFF    Collection Time: 06/13/21  7:16 PM   Result Value Ref Range    WBC 20.2 (H) 4.3 - 11.1 K/uL    RBC 5.51 4.23 - 5.6 M/uL    HGB 16.2 13.6 - 17.2 g/dL    HCT 47.5 41.1 - 50.3 %    MCV 86.2 79.6 - 97.8 FL    MCH 29.4 26.1 - 32.9 PG    MCHC 34.1 31.4 - 35.0 g/dL    RDW 12.9 11.9 - 14.6 %    PLATELET 582 689 - 658 K/uL    MPV 9.7 9.4 - 12.3 FL    ABSOLUTE NRBC 0.00 0.0 - 0.2 K/uL    DF AUTOMATED      NEUTROPHILS 77 43 - 78 %    LYMPHOCYTES 13 13 - 44 %    MONOCYTES 8 4.0 - 12.0 %    EOSINOPHILS 0 (L) 0.5 - 7.8 %    BASOPHILS 1 0.0 - 2.0 %    IMMATURE GRANULOCYTES 1 0.0 - 5.0 %    ABS. NEUTROPHILS 15.6 (H) 1.7 - 8.2 K/UL    ABS. LYMPHOCYTES 2.6 0.5 - 4.6 K/UL    ABS. MONOCYTES 1.7 (H) 0.1 - 1.3 K/UL    ABS. EOSINOPHILS 0.1 0.0 - 0.8 K/UL    ABS. BASOPHILS 0.1 0.0 - 0.2 K/UL    ABS. IMM.  GRANS. 0.2 0.0 - 0.5 K/UL   LIPASE    Collection Time: 06/13/21  7:16 PM   Result Value Ref Range    Lipase 379 73 - 393 U/L   URINE MICROSCOPIC    Collection Time: 06/13/21  8:28 PM   Result Value Ref Range    WBC 3-5 0 /hpf    RBC 0 0 /hpf    Epithelial cells 0-3 0 /hpf    Bacteria 0 0 /hpf    Casts 0 0 /lpf    Crystals, urine 0 0 /LPF    Mucus 2+ (H) 0 /lpf       EKG Results     None          All Micro Results     None          Other Studies:  No results found.       Medications:  Medications Administered      Medications Administered     morphine injection 4 mg     Admin Date  06/13/2021 Action  Given Dose  4 mg Route  IntraVENous Administered By  Margarito Garcia          ondansetron [facility]) injection 4 mg     Admin Date  06/13/2021 Action  Given Dose  4 mg Route  IntraVENous Administered By  Margarito Garcia          pantoprazole (PROTONIX) 40 mg in 0.9% sodium chloride 10 mL injection     Admin Date  06/13/2021 Action  Given Dose  40 mg Route  IntraVENous Administered By  Margarito Garcia          sodium chloride 0.9 % bolus infusion 1,000 mL     Admin Date  06/13/2021 Action  New Bag Dose  1,000 mL Rate  500 mL/hr Route  IntraVENous Administered By  Margarito Garcia                      Problem List:     Hospital Problems as of 6/13/2021 Never Reviewed        Codes Class Noted - Resolved POA    GI bleed ICD-10-CM: K92.2  ICD-9-CM: 578.9  6/13/2021 - Present Unknown                 Signed By: Sonali Hernandez MD   [facility] Hospitalist Service    June 13, 2021  4:22 PM

## 2021-06-14 NOTE — CONSULTS
[facility] Consult Note       Primary GI Physician: Ramo Ramirezpatient-Dr [facility]    Referring Provider:  Dr Shanna Condon Date:  6/14/2021    Admit Date:  6/13/2021    Chief Complaint:  Nausea, vomiting, Hematemsis    Subjective:     History of Present Illness:  Patient is a 38 y.o. male with PMH including but not limited to recent gastroenteritis and GERD, who is seen in consultation at the request of Dr. Marlon Hastings for nausea, vomiting, hematemesis. He had been discharged 6/11, see details below. He reported continued nausea and vomiting despite home medications. He reports several of the episodes last night in ED had streaks of BRB and small clots. Also complains of epigastric pain. Labs in ED with hypokalemia. HGB 16.2. No labs checked today. Was admitted to hospitalist. He was started on IV pantoprazole. On clear liquids. History of GERD with intermittent epigastric pain for several years. Still had some nausea this am, no further vomiting. No current nausea. He does have some epigastric pain. He has not had any BM in 2 days. Last BM did look dark, but pt had taken Pepto Bismol that day. No frequent NSAID use. No recent etoh use. Does smoke cigarettes (about 3 daily). Denies any current marijuana use. No prior EGD or colonoscopy. No known family hx of colon polyps or colon cancer. Recent hospitalization 6/10/21-6/11/21 for gastroenteritis. Presented with 1 day of n/v/diarrhea after eating chicken at a local [employer]. Reported social drinking and marijuana use. At that time, last use was day of admission. He was given Zofran and promethazine without improvement of nausea/vomiting. CT with findings suggestive of mild colitis. He was started on Cipro and Flagyl. He was also given pantoprazole and Carafate. Improved and diet advanced. He was discharged on antibiotics.      PMH:  Past Medical History:   Diagnosis Date    Gastroenteritis 6/10/2021       PSH:  No past surgical history on file.    Allergies:  No Known Allergies    Home Medications:  Prior to Admission medications    Medication Sig Start Date End Date Taking? Authorizing Provider   ciprofloxacin HCl (CIPRO) 500 mg tablet Take 1 Tablet by mouth two (2) times a day for 4 days. 6/11/21 6/15/21 Yes Sourav Hogan MD   metroNIDAZOLE (FLAGYL) 500 mg tablet Take 1 Tablet by mouth three (3) times daily for 4 days. 6/11/21 6/15/21 Yes Sourav Hogan MD   pantoprazole (PROTONIX) 40 mg tablet Take 1 Tablet by mouth two (2) times a day for 14 days. 6/11/21 6/25/21 Yes Sourav Hogan MD   sucralfate (CARAFATE) 1 gram tablet Take 1 Tablet by mouth Before breakfast, lunch, dinner and at bedtime for 7 days. 6/11/21 6/18/21 Yes Sourav Hogan MD   ondansetron (ZOFRAN ODT) 4 mg disintegrating tablet Take 1 Tablet by mouth every eight (8) hours as needed for Nausea or Vomiting for up to 5 days. 6/11/21 6/16/21 Yes Sourav Hogan MD       Hospital Medications:  Current Facility-Administered Medications   Medication Dose Route Frequency    0.45% sodium chloride with KCl 20 mEq/L infusion   IntraVENous CONTINUOUS    sodium chloride (NS) flush 5-40 mL  5-40 mL IntraVENous Q8H    sodium chloride (NS) flush 5-40 mL  5-40 mL IntraVENous PRN    ondansetron (ZOFRAN) injection 4 mg  4 mg IntraVENous Q6H PRN    pantoprazole (PROTONIX) 40 mg in 0.9% sodium chloride 10 mL injection  40 mg IntraVENous Q12H    morphine injection 4 mg  4 mg IntraVENous Q3H PRN       Social History:  Social History     Tobacco Use    Smoking status: Never Smoker   Substance Use Topics    Alcohol use: No       Pt denies any history of drug use, blood transfusions, or tattoos. Family History:  History reviewed. No pertinent family history. Review of Systems:  A detailed 10 system ROS is obtained, with pertinent positives as listed above. All others are negative.     Diet:      Objective:     Physical Exam:  Vitals:  Visit Vitals  BP (!) 131/92 (BP 1 Location: Left upper arm, BP Patient Position: At rest)   Pulse 77   Temp 98.7 °F (37.1 °C)   Resp 16   Ht 6' 1\" (1.854 m)   Wt 117.9 kg (260 lb)   SpO2 100%   BMI 34.30 kg/m²     Gen:  Pt is alert, cooperative, no acute distress  Skin:  Extremities and face reveal no rashes. HEENT: Sclerae anicteric. Extra-occular muscles are intact. No oral ulcers. No abnormal pigmentation of the lips. The neck is supple. Cardiovascular: Regular rate and rhythm. No murmurs, gallops, or rubs. Respiratory:  Comfortable breathing with no accessory muscle use. Clear breath sounds anteriorly with no wheezes, rales, or rhonchi. GI:  Abdomen nondistended, soft, and nontender. Normal active bowel sounds. No enlargement of the liver or spleen. No masses palpable. Rectal:  Deferred  Musculoskeletal:  No pitting edema of the lower legs. Neurological:  Gross memory appears intact. Patient is alert and oriented. Psychiatric:  Mood appears appropriate with judgement intact. Lymphatic:  No cervical or supraclavicular adenopathy. Laboratory:    Recent Labs     06/13/21 1916   WBC 20.2*   HGB 16.2   HCT 47.5      MCV 86.2      K 3.2*      CO2 22   BUN 13   CREA 1.17   CA 8.9   *   AP 58   AST 24   ALT 57   TBILI 0.7   ALB 4.1   TP 7.9   LPSE 379      CT abdomen and pelvis     DATE: 6/10/2021     CLINICAL INFORMATION: Abdominal pain     Spiral images of the abdomen and pelvis were obtained using 1 cc Isovue 370  intravenous contrast.     All CT scanners at this facility use one or all of the following:  automated  exposure control, adjustment of the mA and or kVp according to patient size,  iterative reconstruction     CT ABDOMEN:  Upper images show calcified lymph node inferior left hilum.     Spleen is normal. Liver is normal. No calcified gallstones. Normal pancreas. Normal adrenals. Normal kidneys. No hydronephrosis.  Abdominal aorta is normal in  size.     CT PELVIS:  No mass, adenopathy or abnormal fluid collection.     No bowel obstruction. There does appear to be wall thickening in the ascending  colon with no infiltration in the surrounding fat. This is nonspecific.           IMPRESSION  1. Mild wall thickening in the ascending colon. Assessment:     Active Problems:    GI bleed (6/13/2021)     Patient is a 38 y.o. male with PMH including but not limited to recent gastroenteritis and GERD, who is seen in consultation at the request of Dr. Beni Remy for nausea, vomiting, hematemesis. Recent hospitalization for gastroenteritis. Continued nausea and vomiting at home. Complained of epigastric pain as well. He reports seeing streaks of BRB and small clots in emesis while in ED last night. No further vomiting. Still has epigastric pain. No lower GI symptoms. Labs for today ordered and pending collection. Recommend EGD for further evaluation. Procedure and risks explained to patient, who voices understanding and agrees to proceed. Plan:     -Monitor labs (ordered for today and pending collection)  -Supportive care, antiemetics, etc as per primary team  -Continue pantoprazole 40mg IV BID  -Will add Carafate 1g QID  -EGD tomorrow for further evaluation  -Further recommendations pending procedure results    JUAN Lindquist    Patient is seen and examined in collaboration with Dr. Dale Acevedo. Assessment and plan as per Dr. Dale Acevedo. GI--addendum--patient seen and examined. Agree with above (? MW tear, Helicobacter gastritis, PUD etc. Are all  Possible). Labs noted. The patient is feeling better this evening. Will proceed with an EGD tomorrow as well as repeat labs.   Thanks Ashwin Corral MD

## 2021-06-14 NOTE — PROGRESS NOTES
Care Management Interventions  Current Support Network: Lives with Spouse  Confirm Follow Up Transport: Family  Discharge Location  Discharge Placement: Home  SW reviewed chart and met with pt at the bedside. PT was also discussed in interdisciplinary rounds with RN, PT, dietary and hospitalist.   Pt states he returned to the hospital because his symptoms returned. Pt lives with spouse. Pt is currently unemployed. PT is independent with ADLs and drives. Pt has an appt scheduled with [facility] on 6/23/21. Pt reports this was the f/u appt made for him form 6/10/21 [facility]. Pt reports no needs or concerns at this time.   Crystal Tony, BSW

## 2021-06-14 NOTE — PROGRESS NOTES
TRANSFER - IN REPORT:    Verbal report received from [place] on [facility]  being received from ED for routine progression of care      Report consisted of patients Situation, Background, Assessment and   Recommendations(SBAR). Information from the following report(s) SBAR was reviewed with the receiving nurse. Opportunity for questions and clarification was provided. Assessment completed upon patients arrival to unit and care assumed.

## 2021-06-14 NOTE — ED NOTES
TRANSFER - OUT REPORT:    Verbal report given to Salo Aguilera on [facility]  being transferred to [facility] [id number] for routine progression of care       Report consisted of patients Situation, Background, Assessment and   Recommendations(SBAR). Information from the following report(s) SBAR, Kardex, ED Summary, Procedure Summary and Recent Results was reviewed with the receiving nurse. Lines:   Peripheral IV 06/13/21 Right Antecubital (Active)   Site Assessment Clean, dry, & intact 06/13/21 1919   Phlebitis Assessment 0 06/13/21 1919   Infiltration Assessment 0 06/13/21 1919   Dressing Status Clean, dry, & intact 06/13/21 1919   Dressing Type Transparent 06/13/21 1919   Hub Color/Line Status Pink 06/13/21 1919   Alcohol Cap Used No 06/13/21 1919        Opportunity for questions and clarification was provided.       Patient transported with:   [employer]

## 2021-06-14 NOTE — CONSULTS
[facility] Consult Note       Primary GI Physician: New [patient-Dr Rad Connolly    Referring Provider:  Dr Lisa Laguerre Date:  6/14/2021    Admit Date:  6/13/2021    Chief Complaint:  Nausea, vomiting, Hematemsis    Subjective:     History of Present Illness:  Patient is a 38 y.o. male with PMH including but not limited to recent gastroenteritis and GERD, who is seen in consultation at the request of Dr. Larry Lima for nausea, vomiting, hematemesis. He had been discharged 6/11, see details below. He reported continued nausea and vomiting despite home medications. He reports several of the episodes last night in ED had streaks of BRB and small clots. Also complains of epigastric pain. Labs in ED with hypokalemia. HGB 16.2. No labs checked today. Was admitted to hospitalist. He was started on IV pantoprazole. On clear liquids. History of GERD with intermittent epigastric pain for several years. Still had some nausea this am, no further vomiting. No current nausea. He does have some epigastric pain. He has not had any BM in 2 days. Last BM did look dark, but pt had taken Pepto Bismol that day. No frequent NSAID use. No recent etoh use. Does smoke cigarettes (about 3 daily). Denies any current marijuana use. No prior EGD or colonoscopy. No known family hx of colon polyps or colon cancer. Recent hospitalization 6/10/21-6/11/21 for gastroenteritis. Presented with 1 day of n/v/diarrhea after eating chicken at a local [email or web address]. Reported social drinking and marijuana use. At that time, last use was day of admission. He was given Zofran and promethazine without improvement of nausea/vomiting. CT with findings suggestive of mild colitis. He was started on Cipro and Flagyl. He was also given pantoprazole and Carafate. Improved and diet advanced. He was discharged on antibiotics.      PMH:  Past Medical History:   Diagnosis Date    Gastroenteritis 6/10/2021       PSH:  No past surgical history on file.    Allergies:  No Known Allergies    Home Medications:  Prior to Admission medications    Medication Sig Start Date End Date Taking? Authorizing Provider   ciprofloxacin HCl (CIPRO) 500 mg tablet Take 1 Tablet by mouth two (2) times a day for 4 days. 6/11/21 6/15/21 Yes Scarlett Arcos MD   metroNIDAZOLE (FLAGYL) 500 mg tablet Take 1 Tablet by mouth three (3) times daily for 4 days. 6/11/21 6/15/21 Yes Scarlett Arcos MD   pantoprazole (PROTONIX) 40 mg tablet Take 1 Tablet by mouth two (2) times a day for 14 days. 6/11/21 6/25/21 Yes Scarlett Arcos MD   sucralfate (CARAFATE) 1 gram tablet Take 1 Tablet by mouth Before breakfast, lunch, dinner and at bedtime for 7 days. 6/11/21 6/18/21 Yes Scarlett Arcos MD   ondansetron (ZOFRAN ODT) 4 mg disintegrating tablet Take 1 Tablet by mouth every eight (8) hours as needed for Nausea or Vomiting for up to 5 days. 6/11/21 6/16/21 Yes Scarlett Arcos MD       Hospital Medications:  Current Facility-Administered Medications   Medication Dose Route Frequency    0.45% sodium chloride with KCl 20 mEq/L infusion   IntraVENous CONTINUOUS    sodium chloride (NS) flush 5-40 mL  5-40 mL IntraVENous Q8H    sodium chloride (NS) flush 5-40 mL  5-40 mL IntraVENous PRN    ondansetron (ZOFRAN) injection 4 mg  4 mg IntraVENous Q6H PRN    pantoprazole (PROTONIX) 40 mg in 0.9% sodium chloride 10 mL injection  40 mg IntraVENous Q12H    morphine injection 4 mg  4 mg IntraVENous Q3H PRN       Social History:  Social History     Tobacco Use    Smoking status: Never Smoker   Substance Use Topics    Alcohol use: No       Pt denies any history of drug use, blood transfusions, or tattoos. Family History:  History reviewed. No pertinent family history. Review of Systems:  A detailed 10 system ROS is obtained, with pertinent positives as listed above. All others are negative.     Diet:      Objective:     Physical Exam:  Vitals:  Visit Vitals  BP (!) 131/92 (BP 1 Location: Left upper arm, BP Patient Position: At rest)   Pulse 77   Temp 98.7 °F (37.1 °C)   Resp 16   Ht 6' 1\" (1.854 m)   Wt 117.9 kg (260 lb)   SpO2 100%   BMI 34.30 kg/m²     Gen:  Pt is alert, cooperative, no acute distress  Skin:  Extremities and face reveal no rashes. HEENT: Sclerae anicteric. Extra-occular muscles are intact. No oral ulcers. No abnormal pigmentation of the lips. The neck is supple. Cardiovascular: Regular rate and rhythm. No murmurs, gallops, or rubs. Respiratory:  Comfortable breathing with no accessory muscle use. Clear breath sounds anteriorly with no wheezes, rales, or rhonchi. GI:  Abdomen nondistended, soft, and nontender. Normal active bowel sounds. No enlargement of the liver or spleen. No masses palpable. Rectal:  Deferred  Musculoskeletal:  No pitting edema of the lower legs. Neurological:  Gross memory appears intact. Patient is alert and oriented. Psychiatric:  Mood appears appropriate with judgement intact. Lymphatic:  No cervical or supraclavicular adenopathy. Laboratory:    Recent Labs     06/13/21 1916   WBC 20.2*   HGB 16.2   HCT 47.5      MCV 86.2      K 3.2*      CO2 22   BUN 13   CREA 1.17   CA 8.9   *   AP 58   AST 24   ALT 57   TBILI 0.7   ALB 4.1   TP 7.9   LPSE 379      CT abdomen and pelvis     DATE: 6/10/2021     CLINICAL INFORMATION: Abdominal pain     Spiral images of the abdomen and pelvis were obtained using 1 cc Isovue 370  intravenous contrast.     All CT scanners at this facility use one or all of the following:  automated  exposure control, adjustment of the mA and or kVp according to patient size,  iterative reconstruction     CT ABDOMEN:  Upper images show calcified lymph node inferior left hilum.     Spleen is normal. Liver is normal. No calcified gallstones. Normal pancreas. Normal adrenals. Normal kidneys. No hydronephrosis.  Abdominal aorta is normal in  size.     CT PELVIS:  No mass, adenopathy or abnormal fluid collection.     No bowel obstruction. There does appear to be wall thickening in the ascending  colon with no infiltration in the surrounding fat. This is nonspecific.           IMPRESSION  1. Mild wall thickening in the ascending colon. Assessment:     Active Problems:    GI bleed (6/13/2021)     Patient is a 38 y.o. male with PMH including but not limited to recent gastroenteritis and GERD, who is seen in consultation at the request of Dr. Celia King for nausea, vomiting, hematemesis. Recent hospitalization for gastroenteritis. Continued nausea and vomiting at home. Complained of epigastric pain as well. He reports seeing streaks of BRB and small clots in emesis while in ED last night. No further vomiting. Still has epigastric pain. No lower GI symptoms. Labs for today ordered and pending collection. Recommend EGD for further evaluation. Procedure and risks explained to patient, who voices understanding and agrees to proceed. Plan:     -Monitor labs (ordered for today and pending collection)  -Supportive care, antiemetics, etc as per primary team  -Continue pantoprazole 40mg IV BID  -Will add Carafate 1g QID  -EGD tomorrow for further evaluation  -Further recommendations pending procedure results    JUAN Smith    Patient is seen and examined in collaboration with Dr. Federico Baez. Assessment and plan as per Dr. Federico Baez. GI--addendum--patient seen and examined. Agree with above (? MW tear, Helicobacter gastritis, PUD etc. Are all  Possible). Labs noted. The patient is feeling better this evening. Will proceed with an EGD tomorrow as well as repeat labs.   Thanks Bishop Katie MD

## 2021-06-14 NOTE — H&P (VIEW-ONLY)
[facility] Consult Note Primary GI Physician: Ramo [patient-Dr Caridad Singh Referring Provider:  Dr Aubrey Shay Consult Date:  6/14/2021 Admit Date:  6/13/2021 Chief Complaint:  Nausea, vomiting, Hematemsis Subjective:  
 
History of Present Illness:  Patient is a 38 y.o. male with PMH including but not limited to recent gastroenteritis and GERD, who is seen in consultation at the request of Dr. Aubrey Shay for nausea, vomiting, hematemesis. He had been discharged 6/11, see details below. He reported continued nausea and vomiting despite home medications. He reports several of the episodes last night in ED had streaks of BRB and small clots. Also complains of epigastric pain. Labs in ED with hypokalemia. HGB 16.2. No labs checked today. Was admitted to hospitalist. He was started on IV pantoprazole. On clear liquids. History of GERD with intermittent epigastric pain for several years. Still had some nausea this am, no further vomiting. No current nausea. He does have some epigastric pain. He has not had any BM in 2 days. Last BM did look dark, but pt had taken Pepto Bismol that day. No frequent NSAID use. No recent etoh use. Does smoke cigarettes (about 3 daily). Denies any current marijuana use. No prior EGD or colonoscopy. No known family hx of colon polyps or colon cancer. Recent hospitalization 6/10/21-6/11/21 for gastroenteritis. Presented with 1 day of n/v/diarrhea after eating chicken at a local [employer]. Reported social drinking and marijuana use. At that time, last use was day of admission. He was given Zofran and promethazine without improvement of nausea/vomiting. CT with findings suggestive of mild colitis. He was started on Cipro and Flagyl. He was also given pantoprazole and Carafate. Improved and diet advanced. He was discharged on antibiotics. PMH: 
Past Medical History:  
Diagnosis Date  Gastroenteritis 6/10/2021 PSH: 
No past surgical history on file. 
 
Allergies: 
No Known Allergies Home Medications: 
Prior to Admission medications Medication Sig Start Date End Date Taking? Authorizing Provider  
ciprofloxacin HCl (CIPRO) 500 mg tablet Take 1 Tablet by mouth two (2) times a day for 4 days. 6/11/21 6/15/21 Yes Donnell Bowman MD  
metroNIDAZOLE (FLAGYL) 500 mg tablet Take 1 Tablet by mouth three (3) times daily for 4 days. 6/11/21 6/15/21 Yes Donnell Bowman MD  
pantoprazole (PROTONIX) 40 mg tablet Take 1 Tablet by mouth two (2) times a day for 14 days. 6/11/21 6/25/21 Yes Donnell Bowman MD  
sucralfate (CARAFATE) 1 gram tablet Take 1 Tablet by mouth Before breakfast, lunch, dinner and at bedtime for 7 days. 6/11/21 6/18/21 Yes Donnell Bowman MD  
ondansetron (ZOFRAN ODT) 4 mg disintegrating tablet Take 1 Tablet by mouth every eight (8) hours as needed for Nausea or Vomiting for up to 5 days. 6/11/21 6/16/21 Yes Donnell Bowman MD  
 
 
Hospital Medications: 
Current Facility-Administered Medications Medication Dose Route Frequency  0.45% sodium chloride with KCl 20 mEq/L infusion   IntraVENous CONTINUOUS  
 sodium chloride (NS) flush 5-40 mL  5-40 mL IntraVENous Q8H  
 sodium chloride (NS) flush 5-40 mL  5-40 mL IntraVENous PRN  
 ondansetron (ZOFRAN) injection 4 mg  4 mg IntraVENous Q6H PRN  pantoprazole (PROTONIX) 40 mg in 0.9% sodium chloride 10 mL injection  40 mg IntraVENous Q12H  
 morphine injection 4 mg  4 mg IntraVENous Q3H PRN Social History: 
Social History Tobacco Use  Smoking status: Never Smoker Substance Use Topics  Alcohol use: No  
 
 
Pt denies any history of drug use, blood transfusions, or tattoos. Family History: 
History reviewed. No pertinent family history. Review of Systems: A detailed 10 system ROS is obtained, with pertinent positives as listed above. All others are negative. Diet:   
 
Objective:  
 
Physical Exam: 
Vitals: 
Visit Vitals BP (!) 131/92 (BP 1 Location: Left upper arm, BP Patient Position: At rest) Pulse 77 Temp 98.7 °F (37.1 °C) Resp 16 Ht 6' 1\" (1.854 m) Wt 117.9 kg (260 lb) SpO2 100% BMI 34.30 kg/m² Gen:  Pt is alert, cooperative, no acute distress Skin:  Extremities and face reveal no rashes. HEENT: Sclerae anicteric. Extra-occular muscles are intact. No oral ulcers. No abnormal pigmentation of the lips. The neck is supple. Cardiovascular: Regular rate and rhythm. No murmurs, gallops, or rubs. Respiratory:  Comfortable breathing with no accessory muscle use. Clear breath sounds anteriorly with no wheezes, rales, or rhonchi. GI:  Abdomen nondistended, soft, and nontender. Normal active bowel sounds. No enlargement of the liver or spleen. No masses palpable. Rectal:  Deferred Musculoskeletal:  No pitting edema of the lower legs. Neurological:  Gross memory appears intact. Patient is alert and oriented. Psychiatric:  Mood appears appropriate with judgement intact. Lymphatic:  No cervical or supraclavicular adenopathy. Laboratory:   
Recent Labs  
  06/13/21 1916 WBC 20.2* HGB 16.2 HCT 47.5  MCV 86.2   
K 3.2*  
 CO2 22 BUN 13  
CREA 1.17 CA 8.9 * AP 58 AST 24 ALT 57 TBILI 0.7 ALB 4.1 TP 7.9 LPSE 379 CT abdomen and pelvis 
  
DATE: 6/10/2021 
  
CLINICAL INFORMATION: Abdominal pain 
  
Spiral images of the abdomen and pelvis were obtained using 1 cc Isovue 370 
intravenous contrast. 
  
All CT scanners at this facility use one or all of the following:  automated 
exposure control, adjustment of the mA and or kVp according to patient size, 
iterative reconstruction 
  
CT ABDOMEN: 
Upper images show calcified lymph node inferior left hilum. 
  
Spleen is normal. Liver is normal. No calcified gallstones. Normal pancreas. Normal adrenals. Normal kidneys. No hydronephrosis.  Abdominal aorta is normal in 
size. 
  
CT PELVIS: 
No mass, adenopathy or abnormal fluid collection. 
  
No bowel obstruction. There does appear to be wall thickening in the ascending 
colon with no infiltration in the surrounding fat. This is nonspecific. 
  
  
  
IMPRESSION 1. Mild wall thickening in the ascending colon. Assessment:  
 
Active Problems: 
  GI bleed (6/13/2021) Patient is a 38 y.o. male with PMH including but not limited to recent gastroenteritis and GERD, who is seen in consultation at the request of Dr. Malena Cid for nausea, vomiting, hematemesis. Recent hospitalization for gastroenteritis. Continued nausea and vomiting at home. Complained of epigastric pain as well. He reports seeing streaks of BRB and small clots in emesis while in ED last night. No further vomiting. Still has epigastric pain. No lower GI symptoms. Labs for today ordered and pending collection. Recommend EGD for further evaluation. Procedure and risks explained to patient, who voices understanding and agrees to proceed. Plan:  
 
-Monitor labs (ordered for today and pending collection) -Supportive care, antiemetics, etc as per primary team 
-Continue pantoprazole 40mg IV BID 
-Will add Carafate 1g QID 
-EGD tomorrow for further evaluation 
-Further recommendations pending procedure results JUAN Sandoval Patient is seen and examined in collaboration with Dr. Pily Elise. Assessment and plan as per Dr. Pily Elise. GI--addendum--patient seen and examined. Agree with above (? MW tear, Helicobacter gastritis, PUD etc. Are all  Possible). Labs noted. The patient is feeling better this evening. Will proceed with an EGD tomorrow as well as repeat labs.   Thanks Viktor Momin MD

## 2021-06-14 NOTE — PROGRESS NOTES
TRANSFER - IN REPORT:    Verbal report received from [facility] on [facility]  being received from ED for routine progression of care      Report consisted of patients Situation, Background, Assessment and   Recommendations(SBAR). Information from the following report(s) SBAR was reviewed with the receiving nurse. Opportunity for questions and clarification was provided. Assessment completed upon patients arrival to unit and care assumed.

## 2021-06-14 NOTE — PROGRESS NOTES
Care Management Interventions  Current Support Network: Lives with Spouse  Confirm Follow Up Transport: Family  Discharge Location  Discharge Placement: Home  SW reviewed chart and met with pt at the bedside. PT was also discussed in interdisciplinary rounds with RN, PT, dietary and hospitalist.   Pt states he returned to the hospital because his symptoms returned. Pt lives with spouse. Pt is currently unemployed. PT is independent with ADLs and drives. Pt has an appt scheduled with [facility] on 6/23/21. Pt reports this was the f/u appt made for him form 6/10/21 [place]. Pt reports no needs or concerns at this time.   Wilbert Curiel, BSW

## 2021-06-14 NOTE — ED PROVIDER NOTES
Here with abdominal pain with nausea and vomiting. Just discharged from hospital. States some streaks of blood in emesis. No fever. States has abdominal pain. Some marijuana use. The history is provided by the patient and a friend. Abdominal Pain   This is a new problem. The problem occurs constantly. The pain is located in the generalized abdominal region. Associated symptoms include a fever, diarrhea, melena, nausea, vomiting and frequency. Pertinent negatives include no hematochezia and no dysuria. The pain is worsened by eating. His past medical history is significant for PUD, irritable bowel syndrome and pancreatitis. Past Medical History:   Diagnosis Date    Gastroenteritis 6/10/2021       No past surgical history on file. History reviewed. No pertinent family history. Social History     Socioeconomic History    Marital status: SINGLE     Spouse name: Not on file    Number of children: Not on file    Years of education: Not on file    Highest education level: Not on file   Occupational History    Not on file   Tobacco Use    Smoking status: Never Smoker   Substance and Sexual Activity    Alcohol use: No    Drug use: No    Sexual activity: Not on file   Other Topics Concern    Not on file   Social History Narrative    Not on file     Social Determinants of Health     Financial Resource Strain:     Difficulty of Paying Living Expenses:    Food Insecurity:     Worried About Running Out of Food in the Last Year:     [address] in the Last Year:    Transportation Needs:     Lack of Transportation (Medical):      Lack of Transportation (Non-Medical):    Physical Activity:     Days of Exercise per Week:     Minutes of Exercise per Session:    Stress:     Feeling of Stress :    Social Connections:     Frequency of Communication with Friends and Family:     Frequency of Social Gatherings with Friends and Family:     Attends Voodoo Services:     Active Member of [employer] or Organizations:     Attends Club or Organization Meetings:     Marital Status:    Intimate Partner Violence:     Fear of Current or Ex-Partner:     Emotionally Abused:     Physically Abused:     Sexually Abused: ALLERGIES: Patient has no known allergies. Review of Systems   Constitutional: Positive for fever. Negative for chills and diaphoresis. Respiratory: Negative. Gastrointestinal: Positive for abdominal pain, diarrhea, melena, nausea and vomiting. Negative for hematochezia. Genitourinary: Positive for frequency. Negative for dysuria. Musculoskeletal: Negative. Psychiatric/Behavioral: Negative for confusion and decreased concentration. All other systems reviewed and are negative. Vitals:    06/13/21 1856   BP: (!) 157/101   Pulse: 80   Resp: 18   Temp: 98.2 °F (36.8 °C)   SpO2: 99%   Weight: 117.9 kg (260 lb)   Height: 6' 1\" (1.854 m)            Physical Exam  Vitals and nursing note reviewed. Constitutional:       General: He is not in acute distress. Appearance: He is well-developed. HENT:      Head: Atraumatic. Eyes:      General: No scleral icterus. Cardiovascular:      Rate and Rhythm: Normal rate and regular rhythm. Pulmonary:      Effort: Pulmonary effort is normal. No respiratory distress. Abdominal:      General: Abdomen is flat. Bowel sounds are normal.      Palpations: Abdomen is soft. Tenderness: There is generalized abdominal tenderness. There is no right CVA tenderness, left CVA tenderness, guarding or rebound. Hernia: No hernia is present. Musculoskeletal:      Cervical back: Neck supple. Skin:     General: Skin is warm and dry. Neurological:      Mental Status: He is alert. He is disoriented. Psychiatric:         Mood and Affect: Mood normal.         Behavior: Behavior normal.         Thought Content:  Thought content normal.          MDM  Number of Diagnoses or Management Options  Diagnosis management comments: Appears uncomfortable but not unstable. Continues to vomit and states compliant with meds. No other changes.        Amount and/or Complexity of Data Reviewed  Clinical lab tests: ordered and reviewed    Risk of Complications, Morbidity, and/or Mortality  Presenting problems: moderate  Diagnostic procedures: moderate  Management options: moderate           Procedures

## 2021-06-15 ENCOUNTER — ANESTHESIA (OUTPATIENT)
Dept: ENDOSCOPY | Age: 36
DRG: 368 | End: 2021-06-15
Payer: COMMERCIAL

## 2021-06-15 LAB
ANION GAP SERPL CALC-SCNC: 8 MMOL/L (ref 7–16)
BACTERIA SPEC CULT: NORMAL
BACTERIA SPEC CULT: NORMAL
BASOPHILS # BLD: 0.1 K/UL (ref 0–0.2)
BASOPHILS NFR BLD: 1 % (ref 0–2)
BUN SERPL-MCNC: 8 MG/DL (ref 6–23)
CALCIUM SERPL-MCNC: 8.4 MG/DL (ref 8.3–10.4)
CHLORIDE SERPL-SCNC: 107 MMOL/L (ref 98–107)
CO2 SERPL-SCNC: 24 MMOL/L (ref 21–32)
CREAT SERPL-MCNC: 0.92 MG/DL (ref 0.8–1.5)
DIFFERENTIAL METHOD BLD: ABNORMAL
EOSINOPHIL # BLD: 0.2 K/UL (ref 0–0.8)
EOSINOPHIL NFR BLD: 2 % (ref 0.5–7.8)
ERYTHROCYTE [DISTWIDTH] IN BLOOD BY AUTOMATED COUNT: 12.9 % (ref 11.9–14.6)
GLUCOSE SERPL-MCNC: 96 MG/DL (ref 65–100)
HCT VFR BLD AUTO: 43.6 % (ref 41.1–50.3)
HGB BLD-MCNC: 14.5 G/DL (ref 13.6–17.2)
IMM GRANULOCYTES # BLD AUTO: 0.1 K/UL (ref 0–0.5)
IMM GRANULOCYTES NFR BLD AUTO: 1 % (ref 0–5)
LYMPHOCYTES # BLD: 2.3 K/UL (ref 0.5–4.6)
LYMPHOCYTES NFR BLD: 17 % (ref 13–44)
MAGNESIUM SERPL-MCNC: 2.1 MG/DL (ref 1.8–2.4)
MCH RBC QN AUTO: 29.1 PG (ref 26.1–32.9)
MCHC RBC AUTO-ENTMCNC: 33.3 G/DL (ref 31.4–35)
MCV RBC AUTO: 87.6 FL (ref 79.6–97.8)
MONOCYTES # BLD: 1.3 K/UL (ref 0.1–1.3)
MONOCYTES NFR BLD: 10 % (ref 4–12)
NEUTS SEG # BLD: 9.3 K/UL (ref 1.7–8.2)
NEUTS SEG NFR BLD: 70 % (ref 43–78)
NRBC # BLD: 0 K/UL (ref 0–0.2)
PLATELET # BLD AUTO: 296 K/UL (ref 150–450)
PMV BLD AUTO: 9.6 FL (ref 9.4–12.3)
POTASSIUM SERPL-SCNC: 3.3 MMOL/L (ref 3.5–5.1)
RBC # BLD AUTO: 4.98 M/UL (ref 4.23–5.6)
SERVICE CMNT-IMP: NORMAL
SERVICE CMNT-IMP: NORMAL
SODIUM SERPL-SCNC: 139 MMOL/L (ref 136–145)
WBC # BLD AUTO: 13.3 K/UL (ref 4.3–11.1)

## 2021-06-15 PROCEDURE — 76060000031 HC ANESTHESIA FIRST 0.5 HR: Performed by: INTERNAL MEDICINE

## 2021-06-15 PROCEDURE — 83735 ASSAY OF MAGNESIUM: CPT

## 2021-06-15 PROCEDURE — 36415 COLL VENOUS BLD VENIPUNCTURE: CPT

## 2021-06-15 PROCEDURE — 74011250636 HC RX REV CODE- 250/636: Performed by: HOSPITALIST

## 2021-06-15 PROCEDURE — 85025 COMPLETE CBC W/AUTO DIFF WBC: CPT

## 2021-06-15 PROCEDURE — 74011000250 HC RX REV CODE- 250: Performed by: HOSPITALIST

## 2021-06-15 PROCEDURE — 2709999900 HC NON-CHARGEABLE SUPPLY: Performed by: INTERNAL MEDICINE

## 2021-06-15 PROCEDURE — 74011250637 HC RX REV CODE- 250/637: Performed by: HOSPITALIST

## 2021-06-15 PROCEDURE — 0DB78ZX EXCISION OF STOMACH, PYLORUS, VIA NATURAL OR ARTIFICIAL OPENING ENDOSCOPIC, DIAGNOSTIC: ICD-10-PCS | Performed by: INTERNAL MEDICINE

## 2021-06-15 PROCEDURE — 65270000029 HC RM PRIVATE

## 2021-06-15 PROCEDURE — 88305 TISSUE EXAM BY PATHOLOGIST: CPT

## 2021-06-15 PROCEDURE — 80048 BASIC METABOLIC PNL TOTAL CA: CPT

## 2021-06-15 PROCEDURE — 76040000025: Performed by: INTERNAL MEDICINE

## 2021-06-15 PROCEDURE — 88312 SPECIAL STAINS GROUP 1: CPT

## 2021-06-15 PROCEDURE — 74011250636 HC RX REV CODE- 250/636: Performed by: NURSE ANESTHETIST, CERTIFIED REGISTERED

## 2021-06-15 PROCEDURE — C9113 INJ PANTOPRAZOLE SODIUM, VIA: HCPCS | Performed by: HOSPITALIST

## 2021-06-15 PROCEDURE — 74011000250 HC RX REV CODE- 250: Performed by: NURSE ANESTHETIST, CERTIFIED REGISTERED

## 2021-06-15 PROCEDURE — 77030021593 HC FCPS BIOP ENDOSC BSC -A: Performed by: INTERNAL MEDICINE

## 2021-06-15 RX ORDER — LIDOCAINE HYDROCHLORIDE 20 MG/ML
INJECTION, SOLUTION EPIDURAL; INFILTRATION; INTRACAUDAL; PERINEURAL AS NEEDED
Status: DISCONTINUED | OUTPATIENT
Start: 2021-06-15 | End: 2021-06-15 | Stop reason: HOSPADM

## 2021-06-15 RX ORDER — PROPOFOL 10 MG/ML
INJECTION, EMULSION INTRAVENOUS
Status: DISCONTINUED | OUTPATIENT
Start: 2021-06-15 | End: 2021-06-15 | Stop reason: HOSPADM

## 2021-06-15 RX ORDER — SODIUM CHLORIDE, SODIUM LACTATE, POTASSIUM CHLORIDE, CALCIUM CHLORIDE 600; 310; 30; 20 MG/100ML; MG/100ML; MG/100ML; MG/100ML
100 INJECTION, SOLUTION INTRAVENOUS CONTINUOUS
Status: DISCONTINUED | OUTPATIENT
Start: 2021-06-15 | End: 2021-06-15 | Stop reason: HOSPADM

## 2021-06-15 RX ORDER — SODIUM CHLORIDE 0.9 % (FLUSH) 0.9 %
5-40 SYRINGE (ML) INJECTION EVERY 8 HOURS
Status: DISCONTINUED | OUTPATIENT
Start: 2021-06-15 | End: 2021-06-15 | Stop reason: HOSPADM

## 2021-06-15 RX ORDER — SODIUM CHLORIDE 0.9 % (FLUSH) 0.9 %
5-40 SYRINGE (ML) INJECTION AS NEEDED
Status: DISCONTINUED | OUTPATIENT
Start: 2021-06-15 | End: 2021-06-15 | Stop reason: HOSPADM

## 2021-06-15 RX ORDER — SODIUM CHLORIDE AND POTASSIUM CHLORIDE 150; 450 MG/100ML; MG/100ML
INJECTION, SOLUTION INTRAVENOUS CONTINUOUS
Status: DISCONTINUED | OUTPATIENT
Start: 2021-06-15 | End: 2021-06-15

## 2021-06-15 RX ORDER — PANTOPRAZOLE SODIUM 40 MG/1
40 TABLET, DELAYED RELEASE ORAL
Status: DISCONTINUED | OUTPATIENT
Start: 2021-06-16 | End: 2021-06-16 | Stop reason: HOSPADM

## 2021-06-15 RX ORDER — PROPOFOL 10 MG/ML
INJECTION, EMULSION INTRAVENOUS AS NEEDED
Status: DISCONTINUED | OUTPATIENT
Start: 2021-06-15 | End: 2021-06-15 | Stop reason: HOSPADM

## 2021-06-15 RX ORDER — ONDANSETRON 4 MG/1
4 TABLET, ORALLY DISINTEGRATING ORAL
Status: DISCONTINUED | OUTPATIENT
Start: 2021-06-15 | End: 2021-06-16 | Stop reason: HOSPADM

## 2021-06-15 RX ADMIN — POTASSIUM CHLORIDE AND SODIUM CHLORIDE: 450; 150 INJECTION, SOLUTION INTRAVENOUS at 05:37

## 2021-06-15 RX ADMIN — SODIUM CHLORIDE 40 MG: 9 INJECTION, SOLUTION INTRAMUSCULAR; INTRAVENOUS; SUBCUTANEOUS at 05:37

## 2021-06-15 RX ADMIN — Medication 10 ML: at 05:37

## 2021-06-15 RX ADMIN — PROPOFOL 50 MG: 10 INJECTION, EMULSION INTRAVENOUS at 12:05

## 2021-06-15 RX ADMIN — PROPOFOL 200 MCG/KG/MIN: 10 INJECTION, EMULSION INTRAVENOUS at 12:05

## 2021-06-15 RX ADMIN — POTASSIUM BICARBONATE 20 MEQ: 782 TABLET, EFFERVESCENT ORAL at 14:29

## 2021-06-15 RX ADMIN — POTASSIUM CHLORIDE AND SODIUM CHLORIDE: 450; 150 INJECTION, SOLUTION INTRAVENOUS at 14:30

## 2021-06-15 RX ADMIN — LIDOCAINE HYDROCHLORIDE 20 MG: 20 INJECTION, SOLUTION EPIDURAL; INFILTRATION; INTRACAUDAL; PERINEURAL at 12:05

## 2021-06-15 NOTE — INTERVAL H&P NOTE
Update History & Physical    The Patient's History and Physical of June 14, 2021 was reviewed with the patient and I examined the patient. There was no change. The surgical site was confirmed by the patient and me. Plan:  The risk, benefits, expected outcome, and alternative to the recommended procedure have been discussed with the patient. Patient understands and wants to proceed with the procedure.     Electronically signed by Brock Guillaume MD on 6/15/2021 at 11:59 AM

## 2021-06-15 NOTE — PROGRESS NOTES
[facility] Hospitalist Service Progress Note    INTERVAL HISTORY  / Subjective:  He has no complaints of abdominal pain nor nausea. He had a EGD performed and results reported as esophagitis, he is able to self hydrate, urine is now light and clear yellow, he has belching, and has had a bowel movement. Assessment / Plan:  38years old male with past medical history of GERD presented, history of recent mild colitis was treated with p.o. antibiotics, admitted recently and discharged home on 6/10 presented to emergency room with chief complaint of continuation of nausea and vomiting despite of taking his medications. Patient reports several episodes of nausea, vomiting, and some of the episodes patient had blood in his vomitus with some clots. History of epigastric abdominal discomfort. Patient was evaluated in ER, initial lab work shows evidence of hypokalemia, hemoconcentration on labs.        Discharge Disposition  --06/15 Advance to Clear/Full liquids, Started on PO Protonix, and anticipate d/c to home  06/16     Abdominal pain, intractable nausea and vomiting, hematemesis/acute gastrointestinal hemorrhaging  Risk factors for peptic ulcer disease is tobacco use, denies NSAID or aspirin use, denies cannabis use, his father was an alcoholic so he abstains from alcohol entirely  He did have blood streak in his vomit prior to admission  Gastroenterology consulted and recommends EGD which was planned to be performed tomorrow 06/15   --CT scan from June 11 showed thickening of the ascending colon  --06/15 Resolved ABD pain, EGD performed by gastroenterology reveals esophagitis, and recommendations for transitioning to oral Protonix    Electrolyte abnormalities, dehydration, and hemoconcentration  --He explains he had exposure to elevated ambient temperatures, his apartment had him had been without air conditioning and temperatures as high as 9 degrees in house were recorded, plus prior to his previous discharge June 11 he was prescribed metronidazole which was causing him nauseous  Hypokalemia, elevated creatinine and BUN from last check on June 11  --06/14 dark urine now becoming lighter , IV fluids with one half NS plus potassium additive at 150 cc an hour  --06/15 improved hemoconcentration, he is tolerating advancing clear liquid diet, may discontinue IV, replete potassium orally, and encourage self hydration    Reactive SIRS, noninfectious SIRS  Hemoconcentration secondary to dehydration, again he explains he had exposure to elevated ambient temperatures, his apartment had him had been without air conditioning and temperatures as high as 9 degrees in house were recorded, plus prior to his previous discharge June 11 he was prescribed metronidazole which was causing him nauseous          Objective:  Visit Vitals  BP (!) 153/97 (BP 1 Location: Left upper arm, BP Patient Position: At rest)   Pulse 81   Temp 98.5 °F (36.9 °C)   Resp 18   Ht 6' 1\" (1.854 m)   Wt 117.9 kg (260 lb)   SpO2 100%   BMI 34.30 kg/m²                 Physical Exam:  General: No acute distress, speaking in full sentences, no use of accessory muscles   HEENT: Pupils equal and reactive to light and accommodation, he exhibits dry oral mucosa  Neck: Supple, no lymphadenopathy, no JVD   Lungs: Clear to auscultation bilaterally   Cardiovascular: Regular rate and rhythm with normal S1 and S2   Abdomen: Soft, nontender, nondistended, normoactive bowel sounds   Extremities: No cyanosis clubbing or edema   Neuro: Nonfocal, A&O x3   Psych: Normal affect     Intake and Output:  Date 06/14/21 0700 - 06/15/21 0659 06/15/21 0700 - 06/16/21 0659   Shift 9739-3293 8710-3163 24 Hour Total 3825-6388 9551-1450 24 Hour Total   INTAKE   P.O. 240 240 480 0  0     P. O. 240 240 480 0  0   I. V.(mL/kg/hr) 990(0.7)  990(0.3) 2114 2114     I.V. 990  990 2114 2114   Shift Total(mL/kg) 3211(41.1) 868(4) 6191(87.4) 8667(51.3)  4091(11.3)   OUTPUT   Urine(mL/kg/hr)  300(0.2) 300(0.1) 0  0     Urine Voided  300 300        Urine Occurrence(s) 2 x  2 x        Urine Output    0  0   Blood    0  0     Estimated Blood Loss    0  0   Shift Total(mL/kg)  300(2.5) 300(2.5) 0(0)  0(0)   NET 1230 -60 1170 2114  2114   Weight (kg) 117.9 117.9 117.9 117.9 117.9 117.9       LAB:  Admission on 06/13/2021   Component Date Value    Sodium 06/13/2021 136     Potassium 06/13/2021 3.2*    Chloride 06/13/2021 103     CO2 06/13/2021 22     Anion gap 06/13/2021 11     Glucose 06/13/2021 144*    BUN 06/13/2021 13     Creatinine 06/13/2021 1.17     GFR est AA 06/13/2021 >60     GFR est non-AA 06/13/2021 >60     Calcium 06/13/2021 8.9     Bilirubin, total 06/13/2021 0.7     ALT (SGPT) 06/13/2021 57     AST (SGOT) 06/13/2021 24     Alk. phosphatase 06/13/2021 58     Protein, total 06/13/2021 7.9     Albumin 06/13/2021 4.1     Globulin 06/13/2021 3.8*    A-G Ratio 06/13/2021 1.1*    WBC 06/13/2021 20.2*    RBC 06/13/2021 5.51     HGB 06/13/2021 16.2     HCT 06/13/2021 47.5     MCV 06/13/2021 86.2     MCH 06/13/2021 29.4     MCHC 06/13/2021 34.1     RDW 06/13/2021 12.9     PLATELET 61/57/5735 630     MPV 06/13/2021 9.7     ABSOLUTE NRBC 06/13/2021 0.00     DF 06/13/2021 AUTOMATED     NEUTROPHILS 06/13/2021 77     LYMPHOCYTES 06/13/2021 13     MONOCYTES 06/13/2021 8     EOSINOPHILS 06/13/2021 0*    BASOPHILS 06/13/2021 1     IMMATURE GRANULOCYTES 06/13/2021 1     ABS. NEUTROPHILS 06/13/2021 15.6*    ABS. LYMPHOCYTES 06/13/2021 2.6     ABS. MONOCYTES 06/13/2021 1.7*    ABS. EOSINOPHILS 06/13/2021 0.1     ABS. BASOPHILS 06/13/2021 0.1     ABS. IMM.  GRANS. 06/13/2021 0.2     Lipase 06/13/2021 379     WBC 06/13/2021 3-5     RBC 06/13/2021 0     Epithelial cells 06/13/2021 0-3     Bacteria 06/13/2021 0     Casts 06/13/2021 0     Crystals, urine 06/13/2021 0     Mucus 06/13/2021 2+*    Glucose (POC) 06/14/2021 107*    Performed by 06/14/2021 [email or web address]     WBC 06/15/2021 13.3*    RBC 06/15/2021 4.98     HGB 06/15/2021 14.5     HCT 06/15/2021 43.6     MCV 06/15/2021 87.6     MCH 06/15/2021 29.1     MCHC 06/15/2021 33.3     RDW 06/15/2021 12.9     PLATELET 71/75/9320 501     MPV 06/15/2021 9.6     ABSOLUTE NRBC 06/15/2021 0.00     DF 06/15/2021 AUTOMATED     NEUTROPHILS 06/15/2021 70     LYMPHOCYTES 06/15/2021 17     MONOCYTES 06/15/2021 10     EOSINOPHILS 06/15/2021 2     BASOPHILS 06/15/2021 1     IMMATURE GRANULOCYTES 06/15/2021 1     ABS. NEUTROPHILS 06/15/2021 9.3*    ABS. LYMPHOCYTES 06/15/2021 2.3     ABS. MONOCYTES 06/15/2021 1.3     ABS. EOSINOPHILS 06/15/2021 0.2     ABS. BASOPHILS 06/15/2021 0.1     ABS. IMM. GRANS. 06/15/2021 0.1     Sodium 06/15/2021 139     Potassium 06/15/2021 3.3*    Chloride 06/15/2021 107     CO2 06/15/2021 24     Anion gap 06/15/2021 8     Glucose 06/15/2021 96     BUN 06/15/2021 8     Creatinine 06/15/2021 0.92     GFR est AA 06/15/2021 >60     GFR est non-AA 06/15/2021 >60     Calcium 06/15/2021 8.4     Magnesium 06/15/2021 2.1        IMAGING:  CT ABD PELV W CONT    Result Date: 6/10/2021  1. Mild wall thickening in the ascending colon. EKG:  No results found for this or any previous visit.           Isabel Cardoza MD  6/15/2021 4:25 PM

## 2021-06-15 NOTE — PROGRESS NOTES
[facility] Progress Note    INTERVAL HISTORY  / Subjective:  He has no complaints of abdominal pain nor nausea. He had a EGD performed and results reported as esophagitis, he is able to self hydrate, urine is now light and clear yellow, he has belching, and has had a bowel movement. Assessment / Plan:  38years old male with past medical history of GERD presented, history of recent mild colitis was treated with p.o. antibiotics, admitted recently and discharged home on 6/10 presented to emergency room with chief complaint of continuation of nausea and vomiting despite of taking his medications. Patient reports several episodes of nausea, vomiting, and some of the episodes patient had blood in his vomitus with some clots. History of epigastric abdominal discomfort. Patient was evaluated in ER, initial lab work shows evidence of hypokalemia, hemoconcentration on labs.        Discharge Disposition  --06/15 Advance to Clear/Full liquids, Started on PO Protonix, and anticipate d/c to home  06/16     Abdominal pain, intractable nausea and vomiting, hematemesis/acute gastrointestinal hemorrhaging  -Risk factors for peptic ulcer disease is tobacco use, denies NSAID or aspirin use, denies cannabis use, his father was an alcoholic so he abstains from alcohol entirely  -He did have blood streak in his vomit prior to admission  -Gastroenterology consulted and recommends EGD which was planned to be performed tomorrow 06/15   --CT scan from June 11 showed thickening of the ascending colon  --06/15 Resolved ABD pain, EGD performed by gastroenterology reveals esophagitis, and recommendations for transitioning to oral Protonix    Electrolyte abnormalities, dehydration, and hemoconcentration  --He explains he had exposure to elevated ambient temperatures, his apartment had him had been without air conditioning and temperatures as high as 9 degrees in house were recorded, plus prior to his previous discharge June 11 he was prescribed metronidazole which was causing him nauseous  -Hypokalemia, elevated creatinine and BUN from last check on June 11  --06/14 dark urine now becoming lighter , IV fluids with one half NS plus potassium additive at 150 cc an hour  --06/15 improved hemoconcentration, he is tolerating advancing clear liquid diet, may discontinue IV, replete potassium orally, and encourage self hydration    Reactive SIRS, noninfectious SIRS  -Hemoconcentration secondary to dehydration, again he explains he had exposure to elevated ambient temperatures, his apartment had him had been without air conditioning and temperatures as high as 9 degrees in house were recorded, plus prior to his previous discharge June 11 he was prescribed metronidazole which was causing him nauseous          Objective:  Visit Vitals  BP (!) 153/97 (BP 1 Location: Left upper arm, BP Patient Position: At rest)   Pulse 81   Temp 98.5 °F (36.9 °C)   Resp 18   Ht 6' 1\" (1.854 m)   Wt 117.9 kg (260 lb)   SpO2 100%   BMI 34.30 kg/m²                 Physical Exam:  General: No acute distress, speaking in full sentences, no use of accessory muscles   HEENT: Pupils equal and reactive to light and accommodation, he exhibits dry oral mucosa  Neck: Supple, no lymphadenopathy, no JVD   Lungs: Clear to auscultation bilaterally   Cardiovascular: Regular rate and rhythm with normal S1 and S2   Abdomen: Soft, nontender, nondistended, normoactive bowel sounds   Extremities: No cyanosis clubbing or edema   Neuro: Nonfocal, A&O x3   Psych: Normal affect     Intake and Output:  Date 06/14/21 0700 - 06/15/21 0659 06/15/21 0700 - 06/16/21 0659   Shift 3268-6375 2830-3658 24 Hour Total 7014-1154 2183-5754 24 Hour Total   INTAKE   P.O. 240 240 480 0  0     P. O. 240 240 480 0  0   I. V.(mL/kg/hr) 990(0.7)  990(0.3) 2114 2114     I.V. 990  990 2114 2114   Shift Total(mL/kg) 5387(96.8) 552(9) 5708(81.6) 2050(05.1)  1365(74.6)   OUTPUT   Urine(mL/kg/hr)  300(0.2) 300(0.1) 0  0     Urine Voided  300 300        Urine Occurrence(s) 2 x  2 x        Urine Output    0  0   Blood    0  0     Estimated Blood Loss    0  0   Shift Total(mL/kg)  300(2.5) 300(2.5) 0(0)  0(0)   NET 1230 -60 1170 2114  2114   Weight (kg) 117.9 117.9 117.9 117.9 117.9 117.9       LAB:  Admission on 06/13/2021   Component Date Value    Sodium 06/13/2021 136     Potassium 06/13/2021 3.2*    Chloride 06/13/2021 103     CO2 06/13/2021 22     Anion gap 06/13/2021 11     Glucose 06/13/2021 144*    BUN 06/13/2021 13     Creatinine 06/13/2021 1.17     GFR est AA 06/13/2021 >60     GFR est non-AA 06/13/2021 >60     Calcium 06/13/2021 8.9     Bilirubin, total 06/13/2021 0.7     ALT (SGPT) 06/13/2021 57     AST (SGOT) 06/13/2021 24     Alk. phosphatase 06/13/2021 58     Protein, total 06/13/2021 7.9     Albumin 06/13/2021 4.1     Globulin 06/13/2021 3.8*    A-G Ratio 06/13/2021 1.1*    WBC 06/13/2021 20.2*    RBC 06/13/2021 5.51     HGB 06/13/2021 16.2     HCT 06/13/2021 47.5     MCV 06/13/2021 86.2     MCH 06/13/2021 29.4     MCHC 06/13/2021 34.1     RDW 06/13/2021 12.9     PLATELET 28/18/9395 066     MPV 06/13/2021 9.7     ABSOLUTE NRBC 06/13/2021 0.00     DF 06/13/2021 AUTOMATED     NEUTROPHILS 06/13/2021 77     LYMPHOCYTES 06/13/2021 13     MONOCYTES 06/13/2021 8     EOSINOPHILS 06/13/2021 0*    BASOPHILS 06/13/2021 1     IMMATURE GRANULOCYTES 06/13/2021 1     ABS. NEUTROPHILS 06/13/2021 15.6*    ABS. LYMPHOCYTES 06/13/2021 2.6     ABS. MONOCYTES 06/13/2021 1.7*    ABS. EOSINOPHILS 06/13/2021 0.1     ABS. BASOPHILS 06/13/2021 0.1     ABS. IMM.  GRANS. 06/13/2021 0.2     Lipase 06/13/2021 379     WBC 06/13/2021 3-5     RBC 06/13/2021 0     Epithelial cells 06/13/2021 0-3     Bacteria 06/13/2021 0     Casts 06/13/2021 0     Crystals, urine 06/13/2021 0     Mucus 06/13/2021 2+*    Glucose (POC) 06/14/2021 107*    Performed by 06/14/2021 [facility]     WBC 06/15/2021 13.3*    RBC 06/15/2021 4.98     HGB 06/15/2021 14.5     HCT 06/15/2021 43.6     MCV 06/15/2021 87.6     MCH 06/15/2021 29.1     MCHC 06/15/2021 33.3     RDW 06/15/2021 12.9     PLATELET 80/72/7374 977     MPV 06/15/2021 9.6     ABSOLUTE NRBC 06/15/2021 0.00     DF 06/15/2021 AUTOMATED     NEUTROPHILS 06/15/2021 70     LYMPHOCYTES 06/15/2021 17     MONOCYTES 06/15/2021 10     EOSINOPHILS 06/15/2021 2     BASOPHILS 06/15/2021 1     IMMATURE GRANULOCYTES 06/15/2021 1     ABS. NEUTROPHILS 06/15/2021 9.3*    ABS. LYMPHOCYTES 06/15/2021 2.3     ABS. MONOCYTES 06/15/2021 1.3     ABS. EOSINOPHILS 06/15/2021 0.2     ABS. BASOPHILS 06/15/2021 0.1     ABS. IMM. GRANS. 06/15/2021 0.1     Sodium 06/15/2021 139     Potassium 06/15/2021 3.3*    Chloride 06/15/2021 107     CO2 06/15/2021 24     Anion gap 06/15/2021 8     Glucose 06/15/2021 96     BUN 06/15/2021 8     Creatinine 06/15/2021 0.92     GFR est AA 06/15/2021 >60     GFR est non-AA 06/15/2021 >60     Calcium 06/15/2021 8.4     Magnesium 06/15/2021 2.1        IMAGING:  CT ABD PELV W CONT    Result Date: 6/10/2021  1. Mild wall thickening in the ascending colon. EKG:  No results found for this or any previous visit.           Chano Mchugh MD  6/15/2021 4:25 PM

## 2021-06-15 NOTE — PROCEDURES
PROCEDURE: Esophagogastroduodenoscopy with biopsy    ENDOSCOPIST: Danielle Walker M.D. PREOPERATIVE DIAGNOSIS: N/V, hematemesis    POSTOPERATIVE DIAGNOSIS:     INSTRUMENTS: [id number]    SEDATION: MAC    DESCRIPTION: After informed consent was obtained, the patient was taken to the endoscopy suite and placed in the left lateral decubitus position. Intravenous sedation was administered by the Anesthesia provider. After adequate sedation had been achieved the endoscope was inserted over the tongue and through the posterior pharynx under direct visualization down the esophagus to the stomach and into the second portion of the duodenum. The endoscopic was withdrawn from that point, performing a careful survey of the mucosa. Retroflexion was performed in the gastric fundus. FINDINGS:  Esophagus: LA grade B esophagitis. Stomach: Single erosion at pylorus. Biopsy obtained. Duodenum: Normal    Estimated blood loss:  0 cc-minimal    IMPRESSION: No evidence of GI bleeding. PLAN: Start clear liquid diet. Daily PPI      SONIA Alvarado MD

## 2021-06-15 NOTE — PROGRESS NOTES
Pt called RN to room asking to be disconnected from IV fluids. RN explained that IV fluids were ordered continuously. Pt stated \"I'm having a procedure in the morning and am not supposed to have any fluids, disconnect me. \" Pt educated on what NPO status entails and that IV fluids would be fine for before a procedure. Pt still refused IV fluids and was disconnected.

## 2021-06-15 NOTE — PROGRESS NOTES
TRANSFER - IN REPORT:    Verbal report received from sapna(name) on Sarahi Lapping  being received from [employer]) for routine post - op      Report consisted of patients Situation, Background, Assessment and   Recommendations(SBAR). Information from the following report(s) SBAR, Procedure Summary and Recent Results was reviewed with the receiving nurse. Opportunity for questions and clarification was provided. Assessment completed upon patients arrival to unit and care assumed.

## 2021-06-15 NOTE — ANESTHESIA PREPROCEDURE EVALUATION
Relevant Problems   No relevant active problems       Anesthetic History   No history of anesthetic complications            Review of Systems / Medical History  Patient summary reviewed and pertinent labs reviewed    Pulmonary          Smoker         Neuro/Psych   Within defined limits           Cardiovascular                  Exercise tolerance: >4 METS     GI/Hepatic/Renal               Comments: Intractable nausea, GI bleed Endo/Other             Other Findings              Physical Exam    Airway  Mallampati: II  TM Distance: 4 - 6 cm  Neck ROM: normal range of motion   Mouth opening: Normal     Cardiovascular    Rhythm: regular  Rate: normal         Dental  No notable dental hx       Pulmonary  Breath sounds clear to auscultation               Abdominal  GI exam deferred       Other Findings            Anesthetic Plan    ASA: 2  Anesthesia type: total IV anesthesia          Induction: Intravenous  Anesthetic plan and risks discussed with: Patient

## 2021-06-15 NOTE — PROGRESS NOTES
END OF SHIFT    EGD today  Diet advanced to Full liquid  Hopeful d/c tomorrow    Bedside report given to [employer]

## 2021-06-15 NOTE — PROCEDURES
PROCEDURE: Esophagogastroduodenoscopy with biopsy    ENDOSCOPIST: Oriana Ponce M.D. PREOPERATIVE DIAGNOSIS: N/V, hematemesis    POSTOPERATIVE DIAGNOSIS:     INSTRUMENTS: [id number]    SEDATION: MAC    DESCRIPTION: After informed consent was obtained, the patient was taken to the endoscopy suite and placed in the left lateral decubitus position. Intravenous sedation was administered by the Anesthesia provider. After adequate sedation had been achieved the endoscope was inserted over the tongue and through the posterior pharynx under direct visualization down the esophagus to the stomach and into the second portion of the duodenum. The endoscopic was withdrawn from that point, performing a careful survey of the mucosa. Retroflexion was performed in the gastric fundus. FINDINGS:  Esophagus: LA grade B esophagitis. Stomach: Single erosion at pylorus. Biopsy obtained. Duodenum: Normal    Estimated blood loss:  0 cc-minimal    IMPRESSION: No evidence of GI bleeding. PLAN: Start clear liquid diet. Daily PPI      DRoxi Posadas MD

## 2021-06-15 NOTE — PROGRESS NOTES
Problem: Falls - Risk of  Goal: *Absence of Falls  Description: Document Kirstin Campos Fall Risk and appropriate interventions in the flowsheet.   Outcome: Progressing Towards Goal  Note: Fall Risk Interventions:            Medication Interventions: Teach patient to arise slowly

## 2021-06-15 NOTE — PROGRESS NOTES
TRANSFER - OUT REPORT:    Verbal report given to Alicia(name) on Baron Letters  being transferred to GI lab(unit) for ordered procedure       Report consisted of patients Situation, Background, Assessment and   Recommendations(SBAR). Information from the following report(s) SBAR and Recent Results was reviewed with the receiving nurse. Opportunity for questions and clarification was provided.       Patient transported with:

## 2021-06-15 NOTE — PROGRESS NOTES
TRANSFER - IN REPORT:    Verbal report received from sapna(name) on Gi Simpson  being received from [employer]) for routine post - op      Report consisted of patients Situation, Background, Assessment and   Recommendations(SBAR). Information from the following report(s) SBAR, Procedure Summary and Recent Results was reviewed with the receiving nurse. Opportunity for questions and clarification was provided. Assessment completed upon patients arrival to unit and care assumed.

## 2021-06-15 NOTE — INTERVAL H&P NOTE
Update History & Physical 
 
The Patient's History and Physical of June 14, 2021 was reviewed with the patient and I examined the patient. There was no change. The surgical site was confirmed by the patient and me. Plan:  The risk, benefits, expected outcome, and alternative to the recommended procedure have been discussed with the patient. Patient understands and wants to proceed with the procedure.  
 
Electronically signed by Johny Davenport MD on 6/15/2021 at 11:59 AM

## 2021-06-15 NOTE — ANESTHESIA POSTPROCEDURE EVALUATION
Procedure(s):  ESOPHAGOGASTRODUODENOSCOPY (EGD)/BMI 34 ROOM 351  ESOPHAGOGASTRODUODENAL (EGD) BIOPSY.     total IV anesthesia    Anesthesia Post Evaluation        Patient location during evaluation: PACU  Patient participation: complete - patient participated  Level of consciousness: awake and alert  Pain management: adequate  Airway patency: patent  Anesthetic complications: no  Cardiovascular status: acceptable  Respiratory status: acceptable  Hydration status: acceptable  Post anesthesia nausea and vomiting:  none  Final Post Anesthesia Temperature Assessment:  Normothermia (36.0-37.5 degrees C)      INITIAL Post-op Vital signs:   Vitals Value Taken Time   /84 06/15/21 1250   Temp     Pulse 70 06/15/21 1250   Resp 18 06/15/21 1250   SpO2 99 % 06/15/21 1250

## 2021-06-15 NOTE — PROGRESS NOTES
Problem: Falls - Risk of  Goal: *Absence of Falls  Description: Document Jo Gabriel Fall Risk and appropriate interventions in the flowsheet.   Outcome: Progressing Towards Goal  Note: Fall Risk Interventions:            Medication Interventions: Teach patient to arise slowly

## 2021-06-15 NOTE — PERIOP NOTES
TRANSFER - OUT REPORT:    Verbal report given to [employer] (name) on Cynthia Walters  being transferred to [facility] (unit) for routine post - op       Report consisted of patients Situation, Background, Assessment and   Recommendations(SBAR). Information from the following report(s) SBAR was reviewed with the receiving nurse. Lines:   Peripheral IV 06/13/21 Right Antecubital (Active)   Site Assessment Clean, dry, & intact 06/15/21 1220   Phlebitis Assessment 0 06/15/21 1220   Infiltration Assessment 0 06/15/21 1220   Dressing Status Clean, dry, & intact 06/15/21 1220   Dressing Type Tape;Transparent 06/15/21 1220   Hub Color/Line Status Infusing;Pink 06/15/21 1220   Alcohol Cap Used No 06/15/21 0309        Opportunity for questions and clarification was provided.       Patient transported with:   chart, IV

## 2021-06-15 NOTE — PROGRESS NOTES
TRANSFER - OUT REPORT:    Verbal report given to Alicia(name) on Merrill Redo  being transferred to GI lab(unit) for ordered procedure       Report consisted of patients Situation, Background, Assessment and   Recommendations(SBAR). Information from the following report(s) SBAR and Recent Results was reviewed with the receiving nurse. Opportunity for questions and clarification was provided.       Patient transported with:

## 2021-06-15 NOTE — PERIOP NOTES
TRANSFER - OUT REPORT:    Verbal report given to [employer] (name) on Kassandra Newton  being transferred to [facility] (unit) for routine post - op       Report consisted of patients Situation, Background, Assessment and   Recommendations(SBAR). Information from the following report(s) SBAR was reviewed with the receiving nurse. Lines:   Peripheral IV 06/13/21 Right Antecubital (Active)   Site Assessment Clean, dry, & intact 06/15/21 1220   Phlebitis Assessment 0 06/15/21 1220   Infiltration Assessment 0 06/15/21 1220   Dressing Status Clean, dry, & intact 06/15/21 1220   Dressing Type Tape;Transparent 06/15/21 1220   Hub Color/Line Status Infusing;Pink 06/15/21 1220   Alcohol Cap Used No 06/15/21 0309        Opportunity for questions and clarification was provided.       Patient transported with:   chart, IV

## 2021-06-16 VITALS
HEART RATE: 74 BPM | HEIGHT: 73 IN | BODY MASS INDEX: 34.46 KG/M2 | DIASTOLIC BLOOD PRESSURE: 86 MMHG | WEIGHT: 260 LBS | TEMPERATURE: 99.1 F | SYSTOLIC BLOOD PRESSURE: 138 MMHG | OXYGEN SATURATION: 100 % | RESPIRATION RATE: 18 BRPM

## 2021-06-16 PROCEDURE — 74011250637 HC RX REV CODE- 250/637: Performed by: INTERNAL MEDICINE

## 2021-06-16 RX ORDER — DOCUSATE SODIUM 100 MG
1000 CAPSULE ORAL DAILY
Qty: 3000 ML | Refills: 0 | Status: SHIPPED
Start: 2021-06-16

## 2021-06-16 RX ORDER — PANTOPRAZOLE SODIUM 40 MG/1
40 TABLET, DELAYED RELEASE ORAL
Qty: 30 TABLET | Refills: 0 | Status: SHIPPED | OUTPATIENT
Start: 2021-06-17 | End: 2021-07-17

## 2021-06-16 RX ADMIN — PANTOPRAZOLE SODIUM 40 MG: 40 TABLET, DELAYED RELEASE ORAL at 08:00

## 2021-06-16 NOTE — PROGRESS NOTES
Problem: Falls - Risk of  Goal: *Absence of Falls  Description: Document Saúl Sultanalisset Fall Risk and appropriate interventions in the flowsheet.   Outcome: Resolved/Met  Note: Fall Risk Interventions:            Medication Interventions: Teach patient to arise slowly

## 2021-06-16 NOTE — DISCHARGE SUMMARY
[facility] Discharge Summary    Patient ID:  Rachael Quinn.  male. 1985.  [id number]    Admit date: 6/13/2021  8:03 PM    Discharge date: 06/16/21     Admitting Physician: Gia Cho MD  Attending Physician: Juan Lopez MD  Primary Care Physician: None     Discharge Physician: Anne-Marie Gilmore MD    Discharged Condition: Stable    Indication for Admission:   Chief Complaint   Patient presents with    Abdominal Pain        Reason for hospitalization:   Patient Active Problem List   Diagnosis Code    Gastroenteritis K52.9    Intractable nausea and vomiting R11.2    GI bleed K92.2       Discharge Diagnosis: Dehydration, esophagitis, Single erosion at pylorus  Discharge Disposition: Stable for discharge to home  Follow up Instructions: At some point with gastroenterology, encouraged to establish care with a primary  Did Patient have Sepsis (YES OR NO): No     Hospital Course:   38years old male with past medical history of GERD presented, history of recent mild colitis was treated with p.o. antibiotics, admitted recently and discharged home on 6/10 presented to emergency room with chief complaint of continuation of nausea and vomiting despite of taking his medications.  Patient reports several episodes of nausea, vomiting, and some of the episodes patient had blood in his vomitus with some clots.  History of epigastric abdominal discomfort.  Patient was evaluated in ER, initial lab work shows evidence of hypokalemia, hemoconcentration on labs.          Discharge Disposition  --06/15 Advance to Clear/Full liquids, Started on PO Protonix, and anticipate d/c to home  06/16      Abdominal pain, intractable nausea and vomiting, hematemesis/acute gastrointestinal hemorrhaging  -Risk factors for peptic ulcer disease is tobacco use, denies NSAID or aspirin use, denies cannabis use, his father was an alcoholic so he abstains from alcohol entirely  --He explains he had exposure to elevated ambient temperatures, his apartment had him had been without air conditioning and temperatures as high as 9 degrees in house were recorded, plus prior to his previous discharge June 11 he was prescribed metronidazole which was causing him nauseous  -He did have blood streak in his vomit prior to admission  -Gastroenterology consulted and recommends EGD which was planned to be performed tomorrow 06/15   --CT scan from June 11 showed thickening of the ascending colon  --06/16 DISCHARGED  Resolved ABD pain, EGD performed by gastroenterology reveals esophagitis, Single erosion at pylorus, and recommendations for transitioning to oral Protonix. His diet was advanced and he is able to self nourish and self hydrate without abdominal pain, nausea or vomiting. He was advised to have a gastroenterology follow-up to follow-up biopsy results of the EGD, in addition for following up he finding of the CAT scan of bowel wall thickening.     Electrolyte abnormalities, dehydration, and hemoconcentration  --He explains he had exposure to elevated ambient temperatures, his apartment had him had been without air conditioning and temperatures as high as 9 degrees in house were recorded, plus prior to his previous discharge June 11 he was prescribed metronidazole which was causing him nauseous  -Hypokalemia, elevated creatinine and BUN from last check on June 11  --06/14 dark urine now becoming lighter , IV fluids with one half NS plus potassium additive at 150 cc an hour  --06/15 improved hemoconcentration, he is tolerating advancing clear liquid diet, may discontinue IV, replete potassium orally, and encourage self hydration   --06/16 DISCHARGED  He is able to self nourish and self hydrate without abdominal pain, nausea or vomiting.   Advised encourage oral hydration with oral electrolyte solution.     Reactive SIRS, noninfectious SIRS, Sepsis is not considered   -Hemoconcentration secondary to dehydration, again he explains he had exposure to elevated ambient temperatures, his apartment had him had been without air conditioning and temperatures as high as 9 degrees in house were recorded, plus prior to his previous discharge June 11 he was prescribed metronidazole which was causing him nauseous    Discharge Exam:  Visit Vitals  /86 (BP 1 Location: Left upper arm, BP Patient Position: Sitting)   Pulse 74   Temp 99.1 °F (37.3 °C)   Resp 18   Ht 6' 1\" (1.854 m)   Wt 117.9 kg (260 lb)   SpO2 100%   BMI 34.30 kg/m²      General: No acute distress, speaking in full sentences, no use of accessory muscles   HEENT: Pupils equal and reactive to light and accommodation, oropharynx is clear   Neck: Supple, no lymphadenopathy, no JVD   Lungs: Clear to auscultation bilaterally   Cardiovascular: Regular rate and rhythm with normal S1 and S2   Abdomen: Soft, nontender, nondistended, normoactive bowel sounds   Extremities: No cyanosis clubbing or edema   Neuro: Nonfocal, A&O x3   Psych: Normal affect     Consults: IP CONSULT TO GASTROENTEROLOGY  IP CONSULT TO GASTROENTEROLOGY    Code Status: Full Code    Significant Diagnostic Studies:   CMP: No results found for: NA, K, CL, CO2, AGAP, GLU, BUN, CREA, GFRAA, GFRNA, CA, MG, PHOS, ALB, TBIL, TBILI, TP, ALB, GLOB, AGRAT, ALT      CBC:  No results found for: WBC, HGB, HCT, PLT, HGBEXT, HCTEXT, PLTEXT    No results found for: INR, PTMR, PTP, PT1, PT2, INREXT    ABG:  No results found for: PH, PHI, PCO2, PCO2I, PO2, PO2I, HCO3, HCO3I, FIO2, FIO2I        No results found for: CPK, RCK1, RCK2, RCK3, RCK4, CKMB, CKNDX, CKND1, TROPT, TROIQ, BNPP, BNP        Radiology Reports :   [unfilled]      Discharge Medications:  Current Discharge Medication List      START taking these medications    Details   electrolytes-dextrose (PEDIALYTE) soln solution Take 1,000 mL by mouth daily.   Qty: 3000 mL, Refills: 0         CONTINUE these medications which have CHANGED    Details   pantoprazole (PROTONIX) 40 mg tablet Take 1 Tablet by mouth Daily (before breakfast) for 30 days. Qty: 30 Tablet, Refills: 0         CONTINUE these medications which have NOT CHANGED    Details   ondansetron (ZOFRAN ODT) 4 mg disintegrating tablet Take 1 Tablet by mouth every eight (8) hours as needed for Nausea or Vomiting for up to 5 days. Qty: 15 Tablet, Refills: 0         STOP taking these medications       ciprofloxacin HCl (CIPRO) 500 mg tablet Comments:   Reason for Stopping:         metroNIDAZOLE (FLAGYL) 500 mg tablet Comments:   Reason for Stopping:         sucralfate (CARAFATE) 1 gram tablet Comments:   Reason for Stopping:               Medications Discontinued during this hospitalization:   Medications Discontinued During This Encounter   Medication Reason    pantoprazole (PROTONIX) 40 mg in 0.9% sodium chloride 10 mL injection DOSE ADJUSTMENT    0.9% sodium chloride infusion     0.45% sodium chloride 1,000 mL with potassium chloride 20 mEq infusion REORDER    sucralfate (CARAFATE) tablet 1 g     pantoprazole (PROTONIX) 40 mg in 0.9% sodium chloride 10 mL injection     lactated Ringers infusion Patient Transfer    sodium chloride (NS) flush 5-40 mL Patient Transfer    sodium chloride (NS) flush 5-40 mL Patient Transfer    0.45% sodium chloride with KCl 20 mEq/L infusion        Patient Instructions: Activity: as tolerated. Diet:   ADULT DIET Full Liquid    Therapy Ordered:  No therapy plan of the specified type found. Follow-up appointments: Follow-up Appointments   Procedures    FOLLOW UP VISIT Appointment in: Other (Specify) With GI, establish care with a primary. With GI, establish care with a primary. Standing Status:   Standing     Number of Occurrences:   1     Order Specific Question:   Appointment in     Answer: Other (Specify)       Time Spent on Discharge greater than 30 minutes.     Chato Ramirez MD  6/16/2021 9:32 AM

## 2021-06-16 NOTE — PROGRESS NOTES
Pt discharge instructions given. All questions answered. Port/IV de-accessed. Appointments verified. Pt discharged via wheelchair.

## 2021-06-16 NOTE — PROGRESS NOTES
Problem: Falls - Risk of  Goal: *Absence of Falls  Description: Document Luca Schulz Fall Risk and appropriate interventions in the flowsheet.   Outcome: Progressing Towards Goal  Note: Fall Risk Interventions:            Medication Interventions: Teach patient to arise slowly

## 2021-06-16 NOTE — PROGRESS NOTES
Problem: Falls - Risk of  Goal: *Absence of Falls  Description: Document Jose E Rear Fall Risk and appropriate interventions in the flowsheet.   Outcome: Progressing Towards Goal  Note: Fall Risk Interventions:            Medication Interventions: Teach patient to arise slowly

## 2021-06-16 NOTE — PROGRESS NOTES
[facility] Progress Note         Admit Date:  6/13/2021  Today's Date:  6/16/2021    CC:  N/V, hematemesis    Subjective:     Patient doing well this morning. Denies any further N/V and tolerates diet. No abdominal pain, constipation, or diarrhea. EGD 6/15 Dr. Faustino Sutherland  FINDINGS:  Esophagus: LA grade B esophagitis. Stomach: Single erosion at pylorus. Biopsy obtained. Duodenum: Normal    Estimated blood loss:  0 cc-minimal    IMPRESSION: No evidence of GI bleeding. Medications:   Current Facility-Administered Medications   Medication Dose Route Frequency    pantoprazole (PROTONIX) tablet 40 mg  40 mg Oral ACB    ondansetron (ZOFRAN ODT) tablet 4 mg  4 mg Oral Q8H PRN    sodium chloride (NS) flush 5-40 mL  5-40 mL IntraVENous Q8H    sodium chloride (NS) flush 5-40 mL  5-40 mL IntraVENous PRN    ondansetron (ZOFRAN) injection 4 mg  4 mg IntraVENous Q6H PRN    morphine injection 4 mg  4 mg IntraVENous Q3H PRN       Review of Systems:  ROS was obtained, with pertinent positives as listed above. No chest pain or SOB. Diet:  Full liquid diet    Objective:   Vitals:  Visit Vitals  /86 (BP 1 Location: Left upper arm, BP Patient Position: Sitting)   Pulse 74   Temp 99.1 °F (37.3 °C)   Resp 18   Ht 6' 1\" (1.854 m)   Wt 117.9 kg (260 lb)   SpO2 100%   BMI 34.30 kg/m²     Intake/Output:  No intake/output data recorded. 06/14 1901 - 06/16 0700  In: 2714 [P. O.:600; I.V.:2114]  Out: 575 [Urine:575]  Exam:  General appearance: alert, cooperative, no NAD  Lungs: CTAB  Heart: regular rate and rhythm  Abdomen: soft, non-tender.  Bowel sounds normal.  Extremities: extremities normal, atraumatic, no cyanosis or edema  Neuro:  alert and oriented    Data Review (Labs):    Recent Labs     06/15/21  0727 06/13/21  1916   WBC 13.3* 20.2*   HGB 14.5 16.2   HCT 43.6 47.5    328   MCV 87.6 86.2    136   K 3.3* 3.2*    103   CO2 24 22   BUN 8 13   CREA 0.92 1.17   CA 8.4 8.9   MG 2.1  --    GLU 96 144*   AP  --  58   AST  --  24   ALT  --  57   TBILI  --  0.7   ALB  --  4.1   TP  --  7.9   LPSE  --  379       Assessment:     Principal Problem:  GI bleed     Patient is a 38 y.o. male with PMHx  gastroenteritis and GERD, who is seen in consultation at the request of Dr. Solis Vega for N/V & hematemesis. Recent hospitalization for gastroenteritis. Continued N/V at home. Complained of epigastric pain as well. He reports seeing streaks of BRB and small clots in emesis while in ED last night. No further vomiting. Still has epigastric pain. No lower GI symptoms. EGD finding as above. No further N/V or hematemesis. Hgb WNL. Leukocytosis improved. Plan:     Follow-up bxs results. Advance diet as tolerated. Continue PPI po every day, 30 minutes prior to breakfast.  Avoid NSAIDs. OK to go home from GI standpoint. Pls call with any question. Will arrange outpt FU in a few weeks. Mercedes Ge PA-C  [facility]    GI--agree with above. Will see back in the office. Please call if needed.   Thanks Hamilton Deleon MD

## 2021-06-16 NOTE — PROGRESS NOTES
Problem: Falls - Risk of  Goal: *Absence of Falls  Description: Document Bishop Lyon Fall Risk and appropriate interventions in the flowsheet.   Outcome: Resolved/Met  Note: Fall Risk Interventions:            Medication Interventions: Teach patient to arise slowly

## 2021-06-16 NOTE — PROGRESS NOTES
[facility] Progress Note         Admit Date:  6/13/2021  Today's Date:  6/16/2021    CC:  N/V, hematemesis    Subjective:     Patient doing well this morning. Denies any further N/V and tolerates diet. No abdominal pain, constipation, or diarrhea. EGD 6/15 Dr. Emanuel Lama  FINDINGS:  Esophagus: LA grade B esophagitis. Stomach: Single erosion at pylorus. Biopsy obtained. Duodenum: Normal    Estimated blood loss:  0 cc-minimal    IMPRESSION: No evidence of GI bleeding. Medications:   Current Facility-Administered Medications   Medication Dose Route Frequency    pantoprazole (PROTONIX) tablet 40 mg  40 mg Oral ACB    ondansetron (ZOFRAN ODT) tablet 4 mg  4 mg Oral Q8H PRN    sodium chloride (NS) flush 5-40 mL  5-40 mL IntraVENous Q8H    sodium chloride (NS) flush 5-40 mL  5-40 mL IntraVENous PRN    ondansetron (ZOFRAN) injection 4 mg  4 mg IntraVENous Q6H PRN    morphine injection 4 mg  4 mg IntraVENous Q3H PRN       Review of Systems:  ROS was obtained, with pertinent positives as listed above. No chest pain or SOB. Diet:  Full liquid diet    Objective:   Vitals:  Visit Vitals  /86 (BP 1 Location: Left upper arm, BP Patient Position: Sitting)   Pulse 74   Temp 99.1 °F (37.3 °C)   Resp 18   Ht 6' 1\" (1.854 m)   Wt 117.9 kg (260 lb)   SpO2 100%   BMI 34.30 kg/m²     Intake/Output:  No intake/output data recorded. 06/14 1901 - 06/16 0700  In: 2714 [P. O.:600; I.V.:2114]  Out: 575 [Urine:575]  Exam:  General appearance: alert, cooperative, no NAD  Lungs: CTAB  Heart: regular rate and rhythm  Abdomen: soft, non-tender.  Bowel sounds normal.  Extremities: extremities normal, atraumatic, no cyanosis or edema  Neuro:  alert and oriented    Data Review (Labs):    Recent Labs     06/15/21  0727 06/13/21  1916   WBC 13.3* 20.2*   HGB 14.5 16.2   HCT 43.6 47.5    328   MCV 87.6 86.2    136   K 3.3* 3.2*    103   CO2 24 22   BUN 8 13   CREA 0.92 1.17   CA 8.4 8.9   MG 2.1  --    GLU 96 144*   AP  --  58   AST  --  24   ALT  --  57   TBILI  --  0.7   ALB  --  4.1   TP  --  7.9   LPSE  --  379       Assessment:     Principal Problem:  GI bleed     Patient is a 38 y.o. male with PMHx  gastroenteritis and GERD, who is seen in consultation at the request of Dr. Yuliana Cordon for N/V & hematemesis. Recent hospitalization for gastroenteritis. Continued N/V at home. Complained of epigastric pain as well. He reports seeing streaks of BRB and small clots in emesis while in ED last night. No further vomiting. Still has epigastric pain. No lower GI symptoms. EGD finding as above. No further N/V or hematemesis. Hgb WNL. Leukocytosis improved. Plan:     Follow-up bxs results. Advance diet as tolerated. Continue PPI po every day, 30 minutes prior to breakfast.  Avoid NSAIDs. OK to go home from GI standpoint. Pls call with any question. Will arrange outpt FU in a few weeks. Cyn Gao PA-C  [facility]    GI--agree with above. Will see back in the office. Please call if needed.   Thanks Eliazar Ram MD

## 2021-06-16 NOTE — DISCHARGE SUMMARY
[facility] Discharge Summary    Patient ID:  Alberto Dilia  male. 1985.  [id number]    Admit date: 6/13/2021  8:03 PM    Discharge date: 06/16/21     Admitting Physician: Sravan Valderrama MD  Attending Physician: Toña Washington MD  Primary Care Physician: None     Discharge Physician: Alisha Gama MD    Discharged Condition: Stable    Indication for Admission:   Chief Complaint   Patient presents with    Abdominal Pain        Reason for hospitalization:   Patient Active Problem List   Diagnosis Code    Gastroenteritis K52.9    Intractable nausea and vomiting R11.2    GI bleed K92.2       Discharge Diagnosis: Dehydration, esophagitis, Single erosion at pylorus  Discharge Disposition: Stable for discharge to home  Follow up Instructions: At some point with gastroenterology, encouraged to establish care with a primary  Did Patient have Sepsis (YES OR NO): No     Hospital Course:   38years old male with past medical history of GERD presented, history of recent mild colitis was treated with p.o. antibiotics, admitted recently and discharged home on 6/10 presented to emergency room with chief complaint of continuation of nausea and vomiting despite of taking his medications.  Patient reports several episodes of nausea, vomiting, and some of the episodes patient had blood in his vomitus with some clots.  History of epigastric abdominal discomfort.  Patient was evaluated in ER, initial lab work shows evidence of hypokalemia, hemoconcentration on labs.          Discharge Disposition  --06/15 Advance to Clear/Full liquids, Started on PO Protonix, and anticipate d/c to home  06/16      Abdominal pain, intractable nausea and vomiting, hematemesis/acute gastrointestinal hemorrhaging  Risk factors for peptic ulcer disease is tobacco use, denies NSAID or aspirin use, denies cannabis use, his father was an alcoholic so he abstains from alcohol entirely  --He explains he had exposure to elevated ambient temperatures, his apartment had him had been without air conditioning and temperatures as high as 9 degrees in house were recorded, plus prior to his previous discharge June 11 he was prescribed metronidazole which was causing him nauseous  He did have blood streak in his vomit prior to admission  Gastroenterology consulted and recommends EGD which was planned to be performed tomorrow 06/15   --CT scan from June 11 showed thickening of the ascending colon  --06/16 DISCHARGED  Resolved ABD pain, EGD performed by gastroenterology reveals esophagitis, Single erosion at pylorus, and recommendations for transitioning to oral Protonix. His diet was advanced and he is able to self nourish and self hydrate without abdominal pain, nausea or vomiting. He was advised to have a gastroenterology follow-up to follow-up biopsy results of the EGD, in addition for following up he finding of the CAT scan of bowel wall thickening.     Electrolyte abnormalities, dehydration, and hemoconcentration  --He explains he had exposure to elevated ambient temperatures, his apartment had him had been without air conditioning and temperatures as high as 9 degrees in house were recorded, plus prior to his previous discharge June 11 he was prescribed metronidazole which was causing him nauseous  Hypokalemia, elevated creatinine and BUN from last check on June 11  --06/14 dark urine now becoming lighter , IV fluids with one half NS plus potassium additive at 150 cc an hour  --06/15 improved hemoconcentration, he is tolerating advancing clear liquid diet, may discontinue IV, replete potassium orally, and encourage self hydration   --06/16 DISCHARGED  He is able to self nourish and self hydrate without abdominal pain, nausea or vomiting.   Advised encourage oral hydration with oral electrolyte solution.     Reactive SIRS, noninfectious SIRS, Sepsis is not considered   Hemoconcentration secondary to dehydration, again he explains he had exposure to elevated ambient temperatures, his apartment had him had been without air conditioning and temperatures as high as 9 degrees in house were recorded, plus prior to his previous discharge June 11 he was prescribed metronidazole which was causing him nauseous    Discharge Exam:  Visit Vitals  /86 (BP 1 Location: Left upper arm, BP Patient Position: Sitting)   Pulse 74   Temp 99.1 °F (37.3 °C)   Resp 18   Ht 6' 1\" (1.854 m)   Wt 117.9 kg (260 lb)   SpO2 100%   BMI 34.30 kg/m²      General: No acute distress, speaking in full sentences, no use of accessory muscles   HEENT: Pupils equal and reactive to light and accommodation, oropharynx is clear   Neck: Supple, no lymphadenopathy, no JVD   Lungs: Clear to auscultation bilaterally   Cardiovascular: Regular rate and rhythm with normal S1 and S2   Abdomen: Soft, nontender, nondistended, normoactive bowel sounds   Extremities: No cyanosis clubbing or edema   Neuro: Nonfocal, A&O x3   Psych: Normal affect     Consults: IP CONSULT TO GASTROENTEROLOGY  IP CONSULT TO GASTROENTEROLOGY    Code Status: Full Code    Significant Diagnostic Studies:   CMP: No results found for: NA, K, CL, CO2, AGAP, GLU, BUN, CREA, GFRAA, GFRNA, CA, MG, PHOS, ALB, TBIL, TBILI, TP, ALB, GLOB, AGRAT, ALT      CBC:  No results found for: WBC, HGB, HCT, PLT, HGBEXT, HCTEXT, PLTEXT    No results found for: INR, PTMR, PTP, PT1, PT2, INREXT    ABG:  No results found for: PH, PHI, PCO2, PCO2I, PO2, PO2I, HCO3, HCO3I, FIO2, FIO2I        No results found for: CPK, RCK1, RCK2, RCK3, RCK4, CKMB, CKNDX, CKND1, TROPT, TROIQ, BNPP, BNP        Radiology Reports :   [unfilled]      Discharge Medications:  Current Discharge Medication List      START taking these medications    Details   electrolytes-dextrose (PEDIALYTE) soln solution Take 1,000 mL by mouth daily.   Qty: 3000 mL, Refills: 0         CONTINUE these medications which have CHANGED    Details   pantoprazole (PROTONIX) 40 mg tablet Take 1 Tablet by mouth Daily (before breakfast) for 30 days. Qty: 30 Tablet, Refills: 0         CONTINUE these medications which have NOT CHANGED    Details   ondansetron (ZOFRAN ODT) 4 mg disintegrating tablet Take 1 Tablet by mouth every eight (8) hours as needed for Nausea or Vomiting for up to 5 days. Qty: 15 Tablet, Refills: 0         STOP taking these medications       ciprofloxacin HCl (CIPRO) 500 mg tablet Comments:   Reason for Stopping:         metroNIDAZOLE (FLAGYL) 500 mg tablet Comments:   Reason for Stopping:         sucralfate (CARAFATE) 1 gram tablet Comments:   Reason for Stopping:               Medications Discontinued during this hospitalization:   Medications Discontinued During This Encounter   Medication Reason    pantoprazole (PROTONIX) 40 mg in 0.9% sodium chloride 10 mL injection DOSE ADJUSTMENT    0.9% sodium chloride infusion     0.45% sodium chloride 1,000 mL with potassium chloride 20 mEq infusion REORDER    sucralfate (CARAFATE) tablet 1 g     pantoprazole (PROTONIX) 40 mg in 0.9% sodium chloride 10 mL injection     lactated Ringers infusion Patient Transfer    sodium chloride (NS) flush 5-40 mL Patient Transfer    sodium chloride (NS) flush 5-40 mL Patient Transfer    0.45% sodium chloride with KCl 20 mEq/L infusion        Patient Instructions: Activity: as tolerated. Diet:   ADULT DIET Full Liquid    Therapy Ordered:  No therapy plan of the specified type found. Follow-up appointments: Follow-up Appointments   Procedures    FOLLOW UP VISIT Appointment in: Other (Specify) With GI, establish care with a primary. With GI, establish care with a primary. Standing Status:   Standing     Number of Occurrences:   1     Order Specific Question:   Appointment in     Answer: Other (Specify)       Time Spent on Discharge greater than 30 minutes.     Tia Summers MD  6/16/2021 9:32 AM

## 2022-03-19 PROBLEM — R11.2 INTRACTABLE NAUSEA AND VOMITING: Status: ACTIVE | Noted: 2021-06-10

## 2022-03-20 PROBLEM — K92.2 GI BLEED: Status: ACTIVE | Noted: 2021-06-13

## 2022-03-20 PROBLEM — K52.9 GASTROENTERITIS: Status: ACTIVE | Noted: 2021-06-10

## 2023-12-27 ENCOUNTER — APPOINTMENT (OUTPATIENT)
Dept: CT IMAGING | Age: 38
End: 2023-12-27
Payer: COMMERCIAL

## 2023-12-27 ENCOUNTER — HOSPITAL ENCOUNTER (EMERGENCY)
Age: 38
Discharge: HOME | End: 2023-12-27
Payer: COMMERCIAL

## 2023-12-27 VITALS
TEMPERATURE: 98.5 F | RESPIRATION RATE: 16 BRPM | DIASTOLIC BLOOD PRESSURE: 90 MMHG | BODY MASS INDEX: 35.12 KG/M2 | SYSTOLIC BLOOD PRESSURE: 156 MMHG | WEIGHT: 265 LBS | HEART RATE: 62 BPM | OXYGEN SATURATION: 99 % | HEIGHT: 73 IN

## 2023-12-27 DIAGNOSIS — H61.23 BILATERAL IMPACTED CERUMEN: ICD-10-CM

## 2023-12-27 DIAGNOSIS — J10.1 INFLUENZA B: ICD-10-CM

## 2023-12-27 DIAGNOSIS — R51.9 ACUTE NONINTRACTABLE HEADACHE, UNSPECIFIED HEADACHE TYPE: Primary | ICD-10-CM

## 2023-12-27 LAB
ANION GAP SERPL CALC-SCNC: 5 MMOL/L (ref 2–11)
BASOPHILS # BLD: 0.1 K/UL (ref 0–0.2)
BASOPHILS NFR BLD: 1 % (ref 0–2)
BUN SERPL-MCNC: 11 MG/DL (ref 6–23)
CALCIUM SERPL-MCNC: 8.8 MG/DL (ref 8.3–10.4)
CHLORIDE SERPL-SCNC: 111 MMOL/L (ref 103–113)
CO2 SERPL-SCNC: 26 MMOL/L (ref 21–32)
CREAT SERPL-MCNC: 0.87 MG/DL (ref 0.8–1.5)
DIFFERENTIAL METHOD BLD: ABNORMAL
EOSINOPHIL # BLD: 0.1 K/UL (ref 0–0.8)
EOSINOPHIL NFR BLD: 1 % (ref 0.5–7.8)
ERYTHROCYTE [DISTWIDTH] IN BLOOD BY AUTOMATED COUNT: 13.4 % (ref 11.9–14.6)
FLUAV RNA SPEC QL NAA+PROBE: NOT DETECTED
FLUBV RNA SPEC QL NAA+PROBE: DETECTED
GLUCOSE SERPL-MCNC: 106 MG/DL (ref 65–100)
HCT VFR BLD AUTO: 44 % (ref 41.1–50.3)
HGB BLD-MCNC: 14.2 G/DL (ref 13.6–17.2)
IMM GRANULOCYTES # BLD AUTO: 0.1 K/UL (ref 0–0.5)
IMM GRANULOCYTES NFR BLD AUTO: 1 % (ref 0–5)
LYMPHOCYTES # BLD: 2.2 K/UL (ref 0.5–4.6)
LYMPHOCYTES NFR BLD: 16 % (ref 13–44)
MCH RBC QN AUTO: 29 PG (ref 26.1–32.9)
MCHC RBC AUTO-ENTMCNC: 32.3 G/DL (ref 31.4–35)
MCV RBC AUTO: 90 FL (ref 82–102)
MONOCYTES # BLD: 1.2 K/UL (ref 0.1–1.3)
MONOCYTES NFR BLD: 9 % (ref 4–12)
NEUTS SEG # BLD: 10 K/UL (ref 1.7–8.2)
NEUTS SEG NFR BLD: 73 % (ref 43–78)
NRBC # BLD: 0 K/UL (ref 0–0.2)
PLATELET # BLD AUTO: 257 K/UL (ref 150–450)
PMV BLD AUTO: 9.6 FL (ref 9.4–12.3)
POTASSIUM SERPL-SCNC: 3.8 MMOL/L (ref 3.5–5.1)
RBC # BLD AUTO: 4.89 M/UL (ref 4.23–5.6)
SARS-COV-2 RDRP RESP QL NAA+PROBE: NOT DETECTED
SODIUM SERPL-SCNC: 142 MMOL/L (ref 136–146)
SOURCE: NORMAL
WBC # BLD AUTO: 13.7 K/UL (ref 4.3–11.1)

## 2023-12-27 PROCEDURE — 87502 INFLUENZA DNA AMP PROBE: CPT

## 2023-12-27 PROCEDURE — 70450 CT HEAD/BRAIN W/O DYE: CPT

## 2023-12-27 PROCEDURE — 99284 EMERGENCY DEPT VISIT MOD MDM: CPT

## 2023-12-27 PROCEDURE — 87635 SARS-COV-2 COVID-19 AMP PRB: CPT

## 2023-12-27 PROCEDURE — 69210 REMOVE IMPACTED EAR WAX UNI: CPT

## 2023-12-27 PROCEDURE — 85025 COMPLETE CBC W/AUTO DIFF WBC: CPT

## 2023-12-27 PROCEDURE — 80048 BASIC METABOLIC PNL TOTAL CA: CPT

## 2023-12-27 NOTE — ED TRIAGE NOTES
Pt ambulatory with c/o headache X2 weeks with N/V and fullness in his left ear he states is causing difficulty hearing X3 days.
normal

## 2023-12-27 NOTE — ED NOTES
Irrigated ears. Right ear pt states feels bettter, but the left ear still feels blocked/full. A little bit of wax did come out of the left ear.

## 2023-12-27 NOTE — DISCHARGE INSTRUCTIONS
You are positive for the flu. This is viral and will take some time to improve. Stay well hydrated. Take tylenol and/or ibuprofen if needed for fever, body aches, or headaches. Take over-the-counter Mucinex DM, Delsym, or your preferred over-the-counter cough medication if needed. Return to the ER for any new, worsening, or concerning symptoms. Use over the counter Debrox Drops if needed for ear wax.

## 2023-12-27 NOTE — ED PROVIDER NOTES
Emergency Department Provider Note       PCP: No primary care provider on file. Age: 43 y.o. Sex: male     DISPOSITION Decision To Discharge 12/27/2023 03:56:20 PM       ICD-10-CM    1. Acute nonintractable headache, unspecified headache type  R51.9       2. Bilateral impacted cerumen  H61.23       3. Influenza B  J10.1           Medical Decision Making     Complexity of Problems Addressed:  Complexity of Problem: 1 acute, uncomplicated illness or injury. Data Reviewed and Analyzed:  I independently ordered and reviewed each unique test.         I interpreted the CT Scan. No intracranial hemorrhage. No space-occupying masses. Agree with radiologist impression. Discussion of management or test interpretation. Overall well-appearing 42-year-old male presents emergency department today with complaints of right-sided headache and left ear pain. Headache is unlike any headache in the past.  Headache has been intermittent for 2 weeks. Patient appears in no acute distress. No focal neurological deficits noted on exam.  Bilateral cerumen impactions noted on exam.  Through shared decision-making, we will check basic labs, CT head, COVID swab, and flu swab. Labs and CT are unremarkable. Positive for influenza. Cerumen impaction removed using irrigation and ear curette. Bilateral tympanic membranes intact. No trauma to ear canal or tympanic membrane caused from cerumen removal.  Patient tolerated well. Tympanic membranes without signs of infection or middle ear effusion. Patient reports significant improvement after removal.  Supportive treatment for flu discussed and encouraged. Return precautions discussed. Work note provided. Risk of Complications and/or Morbidity of Patient Management:  Shared medical decision making was utilized in creating the patients health plan today.     History      42-year-old male presents to the emergency department with complaint of right sided headache and

## (undated) DEVICE — CONNECTOR TBNG OD5-7MM O2 END DISP

## (undated) DEVICE — CANNULA NSL ORAL AD FOR CAPNOFLEX CO2 O2 AIRLFE

## (undated) DEVICE — KENDALL RADIOLUCENT FOAM MONITORING ELECTRODE RECTANGULAR SHAPE: Brand: KENDALL

## (undated) DEVICE — CONTAINER PREFIL FRMLN 40ML --

## (undated) DEVICE — BLOCK BITE AD 60FR W/ VELC STRP ADDRESSES MOST PT AND

## (undated) DEVICE — FCPS RAD JAW 4LC 240CM W/NDL -- BX/40